# Patient Record
Sex: FEMALE | Race: WHITE | NOT HISPANIC OR LATINO | Employment: FULL TIME | URBAN - METROPOLITAN AREA
[De-identification: names, ages, dates, MRNs, and addresses within clinical notes are randomized per-mention and may not be internally consistent; named-entity substitution may affect disease eponyms.]

---

## 2024-04-08 ENCOUNTER — APPOINTMENT (OUTPATIENT)
Dept: RADIOLOGY | Facility: HOSPITAL | Age: 44
End: 2024-04-08
Payer: COMMERCIAL

## 2024-04-08 ENCOUNTER — HOSPITAL ENCOUNTER (EMERGENCY)
Facility: HOSPITAL | Age: 44
Discharge: HOME | End: 2024-04-08
Attending: EMERGENCY MEDICINE
Payer: COMMERCIAL

## 2024-04-08 ENCOUNTER — APPOINTMENT (OUTPATIENT)
Dept: CARDIOLOGY | Facility: HOSPITAL | Age: 44
End: 2024-04-08
Payer: COMMERCIAL

## 2024-04-08 VITALS
OXYGEN SATURATION: 100 % | HEART RATE: 69 BPM | SYSTOLIC BLOOD PRESSURE: 164 MMHG | RESPIRATION RATE: 18 BRPM | TEMPERATURE: 98.6 F | DIASTOLIC BLOOD PRESSURE: 84 MMHG

## 2024-04-08 DIAGNOSIS — R68.84 JAW PAIN: Primary | ICD-10-CM

## 2024-04-08 LAB
ALBUMIN SERPL-MCNC: 4.3 G/DL (ref 3.5–5)
ALP BLD-CCNC: 94 U/L (ref 35–125)
ALT SERPL-CCNC: 16 U/L (ref 5–40)
ANION GAP SERPL CALC-SCNC: 12 MMOL/L
APPEARANCE UR: CLEAR
AST SERPL-CCNC: 13 U/L (ref 5–40)
BASOPHILS # BLD AUTO: 0.02 X10*3/UL (ref 0–0.1)
BASOPHILS NFR BLD AUTO: 0.2 %
BILIRUB SERPL-MCNC: <0.2 MG/DL (ref 0.1–1.2)
BILIRUB UR STRIP.AUTO-MCNC: NEGATIVE MG/DL
BUN SERPL-MCNC: 15 MG/DL (ref 8–25)
CALCIUM SERPL-MCNC: 9.1 MG/DL (ref 8.5–10.4)
CHLORIDE SERPL-SCNC: 99 MMOL/L (ref 97–107)
CO2 SERPL-SCNC: 24 MMOL/L (ref 24–31)
COLOR UR: COLORLESS
CREAT SERPL-MCNC: 0.8 MG/DL (ref 0.4–1.6)
EGFRCR SERPLBLD CKD-EPI 2021: >90 ML/MIN/1.73M*2
EOSINOPHIL # BLD AUTO: 0.06 X10*3/UL (ref 0–0.7)
EOSINOPHIL NFR BLD AUTO: 0.6 %
ERYTHROCYTE [DISTWIDTH] IN BLOOD BY AUTOMATED COUNT: 16.7 % (ref 11.5–14.5)
GLUCOSE SERPL-MCNC: 106 MG/DL (ref 65–99)
GLUCOSE UR STRIP.AUTO-MCNC: NORMAL MG/DL
HCG UR QL IA.RAPID: NEGATIVE
HCT VFR BLD AUTO: 35.4 % (ref 36–46)
HGB BLD-MCNC: 10.9 G/DL (ref 12–16)
IMM GRANULOCYTES # BLD AUTO: 0.02 X10*3/UL (ref 0–0.7)
IMM GRANULOCYTES NFR BLD AUTO: 0.2 % (ref 0–0.9)
KETONES UR STRIP.AUTO-MCNC: NEGATIVE MG/DL
LEUKOCYTE ESTERASE UR QL STRIP.AUTO: NEGATIVE
LIPASE SERPL-CCNC: 45 U/L (ref 16–63)
LYMPHOCYTES # BLD AUTO: 2.08 X10*3/UL (ref 1.2–4.8)
LYMPHOCYTES NFR BLD AUTO: 19.9 %
MCH RBC QN AUTO: 22.9 PG (ref 26–34)
MCHC RBC AUTO-ENTMCNC: 30.8 G/DL (ref 32–36)
MCV RBC AUTO: 75 FL (ref 80–100)
MONOCYTES # BLD AUTO: 0.59 X10*3/UL (ref 0.1–1)
MONOCYTES NFR BLD AUTO: 5.6 %
NEUTROPHILS # BLD AUTO: 7.7 X10*3/UL (ref 1.2–7.7)
NEUTROPHILS NFR BLD AUTO: 73.5 %
NITRITE UR QL STRIP.AUTO: NEGATIVE
NRBC BLD-RTO: 0 /100 WBCS (ref 0–0)
PH UR STRIP.AUTO: 5 [PH]
PLATELET # BLD AUTO: 302 X10*3/UL (ref 150–450)
POTASSIUM SERPL-SCNC: 3.6 MMOL/L (ref 3.4–5.1)
PROT SERPL-MCNC: 7.8 G/DL (ref 5.9–7.9)
PROT UR STRIP.AUTO-MCNC: NEGATIVE MG/DL
RBC # BLD AUTO: 4.75 X10*6/UL (ref 4–5.2)
RBC # UR STRIP.AUTO: NEGATIVE /UL
SODIUM SERPL-SCNC: 135 MMOL/L (ref 133–145)
SP GR UR STRIP.AUTO: 1.01
TROPONIN T SERPL-MCNC: <6 NG/L
TROPONIN T SERPL-MCNC: <6 NG/L
UROBILINOGEN UR STRIP.AUTO-MCNC: NORMAL MG/DL
WBC # BLD AUTO: 10.5 X10*3/UL (ref 4.4–11.3)

## 2024-04-08 PROCEDURE — 81025 URINE PREGNANCY TEST: CPT | Performed by: PHYSICIAN ASSISTANT

## 2024-04-08 PROCEDURE — 80053 COMPREHEN METABOLIC PANEL: CPT | Performed by: PHYSICIAN ASSISTANT

## 2024-04-08 PROCEDURE — 36415 COLL VENOUS BLD VENIPUNCTURE: CPT | Performed by: PHYSICIAN ASSISTANT

## 2024-04-08 PROCEDURE — 85025 COMPLETE CBC W/AUTO DIFF WBC: CPT | Performed by: PHYSICIAN ASSISTANT

## 2024-04-08 PROCEDURE — 71046 X-RAY EXAM CHEST 2 VIEWS: CPT | Performed by: STUDENT IN AN ORGANIZED HEALTH CARE EDUCATION/TRAINING PROGRAM

## 2024-04-08 PROCEDURE — 83690 ASSAY OF LIPASE: CPT | Performed by: PHYSICIAN ASSISTANT

## 2024-04-08 PROCEDURE — 81003 URINALYSIS AUTO W/O SCOPE: CPT | Performed by: PHYSICIAN ASSISTANT

## 2024-04-08 PROCEDURE — 93005 ELECTROCARDIOGRAM TRACING: CPT

## 2024-04-08 PROCEDURE — 84484 ASSAY OF TROPONIN QUANT: CPT | Performed by: PHYSICIAN ASSISTANT

## 2024-04-08 PROCEDURE — 71046 X-RAY EXAM CHEST 2 VIEWS: CPT

## 2024-04-08 PROCEDURE — 99283 EMERGENCY DEPT VISIT LOW MDM: CPT | Mod: 25

## 2024-04-08 RX ORDER — ONDANSETRON HYDROCHLORIDE 2 MG/ML
4 INJECTION, SOLUTION INTRAVENOUS ONCE
Status: DISCONTINUED | OUTPATIENT
Start: 2024-04-08 | End: 2024-04-09 | Stop reason: HOSPADM

## 2024-04-08 RX ORDER — BUSPIRONE HYDROCHLORIDE 10 MG/1
10 TABLET ORAL 3 TIMES DAILY
COMMUNITY

## 2024-04-08 RX ORDER — PANTOPRAZOLE SODIUM 40 MG/10ML
40 INJECTION, POWDER, LYOPHILIZED, FOR SOLUTION INTRAVENOUS ONCE
Status: DISCONTINUED | OUTPATIENT
Start: 2024-04-08 | End: 2024-04-09 | Stop reason: HOSPADM

## 2024-04-08 RX ORDER — PANTOPRAZOLE SODIUM 40 MG/1
40 TABLET, DELAYED RELEASE ORAL
COMMUNITY

## 2024-04-08 ASSESSMENT — COLUMBIA-SUICIDE SEVERITY RATING SCALE - C-SSRS
2. HAVE YOU ACTUALLY HAD ANY THOUGHTS OF KILLING YOURSELF?: NO
1. IN THE PAST MONTH, HAVE YOU WISHED YOU WERE DEAD OR WISHED YOU COULD GO TO SLEEP AND NOT WAKE UP?: NO
6. HAVE YOU EVER DONE ANYTHING, STARTED TO DO ANYTHING, OR PREPARED TO DO ANYTHING TO END YOUR LIFE?: NO

## 2024-04-08 ASSESSMENT — LIFESTYLE VARIABLES
EVER FELT BAD OR GUILTY ABOUT YOUR DRINKING: NO
EVER HAD A DRINK FIRST THING IN THE MORNING TO STEADY YOUR NERVES TO GET RID OF A HANGOVER: NO
HAVE YOU EVER FELT YOU SHOULD CUT DOWN ON YOUR DRINKING: NO
TOTAL SCORE: 0
HAVE PEOPLE ANNOYED YOU BY CRITICIZING YOUR DRINKING: NO

## 2024-04-08 ASSESSMENT — PAIN SCALES - GENERAL: PAINLEVEL_OUTOF10: 5 - MODERATE PAIN

## 2024-04-08 ASSESSMENT — PAIN DESCRIPTION - PAIN TYPE: TYPE: ACUTE PAIN

## 2024-04-08 ASSESSMENT — PAIN DESCRIPTION - PROGRESSION: CLINICAL_PROGRESSION: NOT CHANGED

## 2024-04-08 ASSESSMENT — PAIN DESCRIPTION - DESCRIPTORS: DESCRIPTORS: ACHING

## 2024-04-08 ASSESSMENT — PAIN DESCRIPTION - LOCATION: LOCATION: JAW

## 2024-04-08 ASSESSMENT — PAIN - FUNCTIONAL ASSESSMENT: PAIN_FUNCTIONAL_ASSESSMENT: 0-10

## 2024-04-08 NOTE — ED TRIAGE NOTES
Jaw pain Onset 430pm, pt took tylenol with some relief. C/o constipation and nausea since starting ozempic Saturday.

## 2024-04-08 NOTE — LETTER
April 8, 2024    Patient: Lindsey Sage   YOB: 1980   Date of Visit: 4/8/2024       To Whom It May Concern:    Lindsey Sage was seen and treated in our emergency department on 4/8/2024. She can return to work on 4/10/2024 with no restrictions    If you have any questions or concerns, please don't hesitate to call.              CC: No Recipients

## 2024-04-09 LAB — HOLD SPECIMEN: NORMAL

## 2024-04-09 NOTE — ED PROVIDER NOTES
"HPI   Chief Complaint   Patient presents with    Jaw Pain       Patient is a 43-year-old female with past medical history of anxiety presenting with jaw pain from the urgent care.  Patient's history is a [employer] when she began to have bilateral jaw pain.  States that she went to urgent care for \"just a checkup to ensure she was okay \".  Urgent care referred her to the emergency department.  Patient denies any history of cardiac conditions and denies family history of cardiac conditions.  Patient denies fevers, chills, cough, nose, sore throat, chest pain, shortness of breath, abdominal pain, nausea, vomiting, diarrhea.  Patient states that several months ago, she had a cardiac workup done and everything was found to be normal.  States the main reason she wanted to be evaluated because she recently started a new medication, Ozempic several days ago and wanted to ensure that this was not a severe side effect.                          No data recorded                   Patient History   History reviewed. No pertinent past medical history.  Past Surgical History:   Procedure Laterality Date     SECTION, CLASSIC       No family history on file.  Social History     Tobacco Use    Smoking status: Never    Smokeless tobacco: Never   Substance Use Topics    Alcohol use: Not on file    Drug use: Never       Physical Exam   ED Triage Vitals   Temperature Heart Rate Respirations BP   24   37 °C (98.6 °F) 99 16 (!) 150/100      Pulse Ox Temp Source Heart Rate Source Patient Position   24   95 % Oral Monitor Sitting      BP Location FiO2 (%)     24 --     Left arm        Physical Exam  Constitutional:       Appearance: Normal appearance.      Comments: Awake, laying in examination bed, in no acute distress   HENT:      Head: Normocephalic and atraumatic.      Nose: Nose normal.      " Mouth/Throat:      Mouth: Mucous membranes are moist.      Pharynx: Oropharynx is clear.   Eyes:      Extraocular Movements: Extraocular movements intact.      Pupils: Pupils are equal, round, and reactive to light.   Cardiovascular:      Rate and Rhythm: Normal rate and regular rhythm.      Pulses: Normal pulses.      Heart sounds: Normal heart sounds.   Pulmonary:      Effort: Pulmonary effort is normal.      Breath sounds: Normal breath sounds.   Abdominal:      General: Abdomen is flat.      Palpations: Abdomen is soft.   Musculoskeletal:         General: Normal range of motion.      Cervical back: Normal range of motion and neck supple.   Skin:     General: Skin is warm and dry.      Capillary Refill: Capillary refill takes less than 2 seconds.   Neurological:      General: No focal deficit present.      Mental Status: She is alert and oriented to person, place, and time.   Psychiatric:         Mood and Affect: Mood normal.         Behavior: Behavior normal.         ED Course & [facility]   ED Course as of 04/08/24 2334   Mon Apr 08, 2024 1938 EKG interpretation: Sinus tachycardia, rate 104.  Normal axis.  Less than 1 mm of ST depression seen diffusely. [ML]      ED Course User Index  [ML] Thomas Orr MD         Diagnoses as of 04/08/24 2334   Jaw pain       Medical Decision Making  Patient is a 43-year-old male with past medical history of anxiety presenting with jaw pain.  CBC, CMP, magnesium, troponin, chest x-ray ordered.  Patient had initially endorsed some nausea, Zofran ordered.  IV fluids ordered.  Protonix ordered due to patient endorsing GERD like symptoms.  Conditions considered include but not limited to: Dental complication, ACS, anxiety related, musculoskeletal issue.      CBC is without leukocytosis but does show evidence of anemia with hemoglobin at 10.9.  Patient states that she does chronically suffer from anemia so this does appear stable.  CMP is without significant electrolyte abnormality.   Magnesium within normal limits.  Initial and repeat troponin within normal notes.  Chest x-ray is without acute cardiopulmonary process.  I believe this patient is at low risk for complication, and a disoposition of discharge is acceptable.  Return to the Emergency Department if new or worsening symptoms including headache, fever, chills, chest pain, shortness of breath, syncope, near syncope, abdominal pain, nausea, vomiting,  diarrhea, or worsening pain.  Discussed with the patient to follow-up with primary care to further discuss her acute onset of jaw pain symptoms.  She states that she is not currently suffering from jaw pain and that resolved prior to arrival to the emergency department.  Nursing staff tells me that the patient did refuse IV medications ordered.  Patient is agreeable to disposition of discharge with follow-up with primary care.      Portions of this note made with Dragon software, please be mindful of potential grammatical errors.        Medications   ondansetron (Zofran) injection 4 mg (4 mg intravenous Not Given 4/8/24 1920)   sodium chloride 0.9 % bolus 1,000 mL (1,000 mL intravenous Not Given 4/8/24 1920)   pantoprazole (ProtoNix) injection 40 mg (40 mg intravenous Not Given 4/8/24 2050)         Labs Reviewed   CBC WITH AUTO DIFFERENTIAL - Abnormal       Result Value    WBC 10.5      nRBC 0.0      RBC 4.75      Hemoglobin 10.9 (*)     Hematocrit 35.4 (*)     MCV 75 (*)     MCH 22.9 (*)     MCHC 30.8 (*)     RDW 16.7 (*)     Platelets 302      Neutrophils % 73.5      Immature Granulocytes %, Automated 0.2      Lymphocytes % 19.9      Monocytes % 5.6      Eosinophils % 0.6      Basophils % 0.2      Neutrophils Absolute 7.70      Immature Granulocytes Absolute, Automated 0.02      Lymphocytes Absolute 2.08      Monocytes Absolute 0.59      Eosinophils Absolute 0.06      Basophils Absolute 0.02     COMPREHENSIVE METABOLIC PANEL - Abnormal    Glucose 106 (*)     Sodium 135      Potassium 3.6       Chloride 99      Bicarbonate 24      Urea Nitrogen 15      Creatinine 0.80      eGFR >90      Calcium 9.1      Albumin 4.3      Alkaline Phosphatase 94      Total Protein 7.8      AST 13      Bilirubin, Total <0.2      ALT 16      Anion Gap 12     URINALYSIS WITH REFLEX CULTURE AND MICROSCOPIC - Abnormal    Color, Urine Colorless (*)     Appearance, Urine Clear      Specific Gravity, Urine 1.014      pH, Urine 5.0      Protein, Urine NEGATIVE      Glucose, Urine Normal      Blood, Urine NEGATIVE      Ketones, Urine NEGATIVE      Bilirubin, Urine NEGATIVE      Urobilinogen, Urine Normal      Nitrite, Urine NEGATIVE      Leukocyte Esterase, Urine NEGATIVE     HCG, URINE, QUALITATIVE - Normal    HCG, Urine NEGATIVE     SERIAL TROPONIN, INITIAL ([facility]) - Normal    Troponin T, High Sensitivity <6     LIPASE - Normal    Lipase 45     SERIAL TROPONIN,  2 HOUR ([facility]) - Normal    Troponin T, High Sensitivity <6     URINALYSIS WITH REFLEX CULTURE AND MICROSCOPIC    Narrative:     The following orders were created for panel order Urinalysis with Reflex Culture and Microscopic.  Procedure                               Abnormality         Status                     ---------                               -----------         ------                     Urinalysis with Reflex C...[[id number]]  Abnormal            Final result               Extra Urine Gray Tube[[id number]]                            In process                   Please view results for these tests on the individual orders.   TROPONIN T SERIES, HIGH SENSITIVITY (0, 2 HR, 6 HR)    Narrative:     The following orders were created for panel order Troponin T Series, High Sensitivity (0, 2HR, 6HR).  Procedure                               Abnormality         Status                     ---------                               -----------         ------                     Serial Troponin, Initial...[[id number]]  Normal              Final result               Serial Troponin,  2 Hour ...[[id number]]  Normal              Final result               Serial Troponin, 6 Hour ...[[id number]]                                                   Please view results for these tests on the individual orders.   EXTRA URINE GRAY TUBE   SERIAL TROPONIN, 6 HOUR ([facility])         XR chest 2 views   Final Result   1.  No evidence of acute cardiopulmonary process.                  MACRO:   None        Signed by: Tammi Mayer 4/8/2024 8:15 PM   Dictation workstation:   [id number]            Procedure  Procedures     Renzo King PA-C  04/08/24 2334

## 2024-04-13 LAB
ATRIAL RATE: 104 BPM
P AXIS: 43 DEGREES
P OFFSET: 200 MS
P ONSET: 144 MS
PR INTERVAL: 146 MS
Q ONSET: 217 MS
QRS COUNT: 17 BEATS
QRS DURATION: 78 MS
QT INTERVAL: 326 MS
QTC CALCULATION(BAZETT): 428 MS
QTC FREDERICIA: 391 MS
R AXIS: 42 DEGREES
T AXIS: -21 DEGREES
T OFFSET: 380 MS
VENTRICULAR RATE: 104 BPM

## 2024-04-15 ENCOUNTER — HOSPITAL ENCOUNTER (OUTPATIENT)
Dept: RADIOLOGY | Facility: HOSPITAL | Age: 44
Discharge: HOME | End: 2024-04-15
Payer: COMMERCIAL

## 2024-04-15 DIAGNOSIS — M79.89 LEFT LEG SWELLING: ICD-10-CM

## 2024-04-15 PROCEDURE — 93971 EXTREMITY STUDY: CPT

## 2024-04-15 PROCEDURE — 93971 EXTREMITY STUDY: CPT | Performed by: RADIOLOGY

## 2024-06-26 ENCOUNTER — HOSPITAL ENCOUNTER (EMERGENCY)
Facility: HOSPITAL | Age: 44
Discharge: HOME | End: 2024-06-26
Attending: STUDENT IN AN ORGANIZED HEALTH CARE EDUCATION/TRAINING PROGRAM
Payer: COMMERCIAL

## 2024-06-26 ENCOUNTER — APPOINTMENT (OUTPATIENT)
Dept: CARDIOLOGY | Facility: HOSPITAL | Age: 44
End: 2024-06-26
Payer: COMMERCIAL

## 2024-06-26 ENCOUNTER — APPOINTMENT (OUTPATIENT)
Dept: RADIOLOGY | Facility: HOSPITAL | Age: 44
End: 2024-06-26
Payer: COMMERCIAL

## 2024-06-26 VITALS
TEMPERATURE: 98.4 F | DIASTOLIC BLOOD PRESSURE: 99 MMHG | OXYGEN SATURATION: 100 % | RESPIRATION RATE: 16 BRPM | WEIGHT: 293 LBS | SYSTOLIC BLOOD PRESSURE: 167 MMHG | HEART RATE: 87 BPM | BODY MASS INDEX: 50.02 KG/M2 | HEIGHT: 64 IN

## 2024-06-26 DIAGNOSIS — R07.9 CHEST PAIN, UNSPECIFIED TYPE: Primary | ICD-10-CM

## 2024-06-26 DIAGNOSIS — R10.84 GENERALIZED ABDOMINAL PAIN: ICD-10-CM

## 2024-06-26 DIAGNOSIS — K21.9 GASTROESOPHAGEAL REFLUX DISEASE, UNSPECIFIED WHETHER ESOPHAGITIS PRESENT: ICD-10-CM

## 2024-06-26 LAB
ALBUMIN SERPL-MCNC: 4.2 G/DL (ref 3.5–5)
ALP BLD-CCNC: 81 U/L (ref 35–125)
ALT SERPL-CCNC: 12 U/L (ref 5–40)
ANION GAP SERPL CALC-SCNC: 10 MMOL/L
APPEARANCE UR: CLEAR
AST SERPL-CCNC: 15 U/L (ref 5–40)
ATRIAL RATE: 95 BPM
BASOPHILS # BLD AUTO: 0.02 X10*3/UL (ref 0–0.1)
BASOPHILS NFR BLD AUTO: 0.3 %
BILIRUB SERPL-MCNC: 0.2 MG/DL (ref 0.1–1.2)
BILIRUB UR STRIP.AUTO-MCNC: NEGATIVE MG/DL
BUN SERPL-MCNC: 12 MG/DL (ref 8–25)
CALCIUM SERPL-MCNC: 9.6 MG/DL (ref 8.5–10.4)
CHLORIDE SERPL-SCNC: 101 MMOL/L (ref 97–107)
CO2 SERPL-SCNC: 25 MMOL/L (ref 24–31)
COLOR UR: COLORLESS
CREAT SERPL-MCNC: 0.9 MG/DL (ref 0.4–1.6)
EGFRCR SERPLBLD CKD-EPI 2021: 82 ML/MIN/1.73M*2
EOSINOPHIL # BLD AUTO: 0.09 X10*3/UL (ref 0–0.7)
EOSINOPHIL NFR BLD AUTO: 1.2 %
ERYTHROCYTE [DISTWIDTH] IN BLOOD BY AUTOMATED COUNT: 17.8 % (ref 11.5–14.5)
GLUCOSE SERPL-MCNC: 99 MG/DL (ref 65–99)
GLUCOSE UR STRIP.AUTO-MCNC: NORMAL MG/DL
HCG UR QL IA.RAPID: NEGATIVE
HCT VFR BLD AUTO: 35.5 % (ref 36–46)
HGB BLD-MCNC: 10.9 G/DL (ref 12–16)
IMM GRANULOCYTES # BLD AUTO: 0.03 X10*3/UL (ref 0–0.7)
IMM GRANULOCYTES NFR BLD AUTO: 0.4 % (ref 0–0.9)
KETONES UR STRIP.AUTO-MCNC: NEGATIVE MG/DL
LEUKOCYTE ESTERASE UR QL STRIP.AUTO: NEGATIVE
LIPASE SERPL-CCNC: 57 U/L (ref 16–63)
LYMPHOCYTES # BLD AUTO: 2.17 X10*3/UL (ref 1.2–4.8)
LYMPHOCYTES NFR BLD AUTO: 28.3 %
MCH RBC QN AUTO: 22.9 PG (ref 26–34)
MCHC RBC AUTO-ENTMCNC: 30.7 G/DL (ref 32–36)
MCV RBC AUTO: 75 FL (ref 80–100)
MONOCYTES # BLD AUTO: 0.54 X10*3/UL (ref 0.1–1)
MONOCYTES NFR BLD AUTO: 7 %
NEUTROPHILS # BLD AUTO: 4.82 X10*3/UL (ref 1.2–7.7)
NEUTROPHILS NFR BLD AUTO: 62.8 %
NITRITE UR QL STRIP.AUTO: NEGATIVE
NRBC BLD-RTO: 0 /100 WBCS (ref 0–0)
P AXIS: 37 DEGREES
P OFFSET: 204 MS
P ONSET: 154 MS
PH UR STRIP.AUTO: 5.5 [PH]
PLATELET # BLD AUTO: 290 X10*3/UL (ref 150–450)
POTASSIUM SERPL-SCNC: 3.8 MMOL/L (ref 3.4–5.1)
PR INTERVAL: 136 MS
PROT SERPL-MCNC: 7.4 G/DL (ref 5.9–7.9)
PROT UR STRIP.AUTO-MCNC: NEGATIVE MG/DL
Q ONSET: 222 MS
QRS COUNT: 16 BEATS
QRS DURATION: 76 MS
QT INTERVAL: 324 MS
QTC CALCULATION(BAZETT): 407 MS
QTC FREDERICIA: 377 MS
R AXIS: 22 DEGREES
RBC # BLD AUTO: 4.75 X10*6/UL (ref 4–5.2)
RBC # UR STRIP.AUTO: NEGATIVE /UL
SODIUM SERPL-SCNC: 136 MMOL/L (ref 133–145)
SP GR UR STRIP.AUTO: 1.01
T AXIS: 2 DEGREES
T OFFSET: 384 MS
TROPONIN T SERPL-MCNC: 6 NG/L
TROPONIN T SERPL-MCNC: <6 NG/L
UROBILINOGEN UR STRIP.AUTO-MCNC: NORMAL MG/DL
VENTRICULAR RATE: 95 BPM
WBC # BLD AUTO: 7.7 X10*3/UL (ref 4.4–11.3)

## 2024-06-26 PROCEDURE — 80053 COMPREHEN METABOLIC PANEL: CPT

## 2024-06-26 PROCEDURE — 2500000004 HC RX 250 GENERAL PHARMACY W/ HCPCS (ALT 636 FOR OP/ED)

## 2024-06-26 PROCEDURE — 2550000001 HC RX 255 CONTRASTS

## 2024-06-26 PROCEDURE — 81003 URINALYSIS AUTO W/O SCOPE: CPT

## 2024-06-26 PROCEDURE — 83690 ASSAY OF LIPASE: CPT

## 2024-06-26 PROCEDURE — 99285 EMERGENCY DEPT VISIT HI MDM: CPT | Mod: 25

## 2024-06-26 PROCEDURE — 71045 X-RAY EXAM CHEST 1 VIEW: CPT

## 2024-06-26 PROCEDURE — 96361 HYDRATE IV INFUSION ADD-ON: CPT

## 2024-06-26 PROCEDURE — 74177 CT ABD & PELVIS W/CONTRAST: CPT

## 2024-06-26 PROCEDURE — 93005 ELECTROCARDIOGRAM TRACING: CPT

## 2024-06-26 PROCEDURE — 71045 X-RAY EXAM CHEST 1 VIEW: CPT | Performed by: RADIOLOGY

## 2024-06-26 PROCEDURE — 74177 CT ABD & PELVIS W/CONTRAST: CPT | Performed by: RADIOLOGY

## 2024-06-26 PROCEDURE — 36415 COLL VENOUS BLD VENIPUNCTURE: CPT

## 2024-06-26 PROCEDURE — 2500000001 HC RX 250 WO HCPCS SELF ADMINISTERED DRUGS (ALT 637 FOR MEDICARE OP)

## 2024-06-26 PROCEDURE — 85025 COMPLETE CBC W/AUTO DIFF WBC: CPT

## 2024-06-26 PROCEDURE — 96374 THER/PROPH/DIAG INJ IV PUSH: CPT

## 2024-06-26 PROCEDURE — 84484 ASSAY OF TROPONIN QUANT: CPT

## 2024-06-26 PROCEDURE — 81025 URINE PREGNANCY TEST: CPT

## 2024-06-26 PROCEDURE — 96375 TX/PRO/DX INJ NEW DRUG ADDON: CPT

## 2024-06-26 RX ORDER — FAMOTIDINE 10 MG/ML
20 INJECTION INTRAVENOUS ONCE
Status: COMPLETED | OUTPATIENT
Start: 2024-06-26 | End: 2024-06-26

## 2024-06-26 RX ORDER — LIDOCAINE HYDROCHLORIDE 20 MG/ML
15 SOLUTION OROPHARYNGEAL ONCE
Status: COMPLETED | OUTPATIENT
Start: 2024-06-26 | End: 2024-06-26

## 2024-06-26 RX ORDER — ALUMINUM HYDROXIDE, MAGNESIUM HYDROXIDE, AND SIMETHICONE 1200; 120; 1200 MG/30ML; MG/30ML; MG/30ML
30 SUSPENSION ORAL ONCE
Status: COMPLETED | OUTPATIENT
Start: 2024-06-26 | End: 2024-06-26

## 2024-06-26 RX ORDER — ONDANSETRON HYDROCHLORIDE 2 MG/ML
4 INJECTION, SOLUTION INTRAVENOUS ONCE
Status: COMPLETED | OUTPATIENT
Start: 2024-06-26 | End: 2024-06-26

## 2024-06-26 ASSESSMENT — PAIN SCALES - GENERAL
PAINLEVEL_OUTOF10: 3
PAINLEVEL_OUTOF10: 3

## 2024-06-26 ASSESSMENT — PAIN DESCRIPTION - LOCATION: LOCATION: CHEST

## 2024-06-26 ASSESSMENT — LIFESTYLE VARIABLES
HAVE YOU EVER FELT YOU SHOULD CUT DOWN ON YOUR DRINKING: NO
EVER FELT BAD OR GUILTY ABOUT YOUR DRINKING: NO
EVER HAD A DRINK FIRST THING IN THE MORNING TO STEADY YOUR NERVES TO GET RID OF A HANGOVER: NO
TOTAL SCORE: 0
HAVE PEOPLE ANNOYED YOU BY CRITICIZING YOUR DRINKING: NO

## 2024-06-26 ASSESSMENT — HEART SCORE
TROPONIN: LESS THAN OR EQUAL TO NORMAL LIMIT
RISK FACTORS: 1-2 RISK FACTORS
AGE: <45
HEART SCORE: 2
HISTORY: SLIGHTLY SUSPICIOUS
ECG: NON-SPECIFIC REPOLARIZATION DISTURBANCE

## 2024-06-26 ASSESSMENT — COLUMBIA-SUICIDE SEVERITY RATING SCALE - C-SSRS
1. IN THE PAST MONTH, HAVE YOU WISHED YOU WERE DEAD OR WISHED YOU COULD GO TO SLEEP AND NOT WAKE UP?: NO
6. HAVE YOU EVER DONE ANYTHING, STARTED TO DO ANYTHING, OR PREPARED TO DO ANYTHING TO END YOUR LIFE?: NO
2. HAVE YOU ACTUALLY HAD ANY THOUGHTS OF KILLING YOURSELF?: NO

## 2024-06-26 ASSESSMENT — PAIN DESCRIPTION - DESCRIPTORS: DESCRIPTORS: BURNING;DISCOMFORT

## 2024-06-26 ASSESSMENT — PAIN - FUNCTIONAL ASSESSMENT: PAIN_FUNCTIONAL_ASSESSMENT: 0-10

## 2024-06-26 ASSESSMENT — PAIN DESCRIPTION - PAIN TYPE: TYPE: ACUTE PAIN

## 2024-06-26 NOTE — DISCHARGE INSTRUCTIONS
Follow closely with your primary care provider in the following 1 to 2 days.    Follow-up closely with cardiology listed.

## 2024-06-26 NOTE — ED PROVIDER NOTES
HPI   Chief Complaint   Patient presents with    Chest Pain       Patient is a 43-year-old female presents emergency department for evaluation of abdominal pain, bloating, and chest pain.  She states that she was sent over by her primary care provider for further evaluation given her chest pain and bloating.  She states that she has been on Ozempic for 10 weeks for weight loss.  She states that at the beginning she had similar symptoms, but they went away.  She states over the last few days she has had increased belching, gas, bloating, and abdominal pain.  She describes it as a cramping dull aching pain.  She states that she has had increased burning in her chest as well.  She states the pain has been more persistent in the chest and comes and goes, but causes her to have some anxiety.  She does not necessarily feel short of breath with it.  She states that she has a familial history of early cardiac death, but no personal history of any cardiac disease.  She denies any history of high blood pressure, high cholesterol, or diabetes.  She states that she has been dealing with some constipation as well and wonders if this may be contributing.  She states because of the pain in her abdomen and chest, her primary care provider was concerned for possible obstruction and sent her in for further evaluation.  She states that she has been having bowel movements that are harder and more firm.  She is been urinating without any difficulty.  She denies any lightheadedness, dizziness, vomiting, fevers, chills, cough, congestion.      History provided by:  Patient   used: No                        Rowan Coma Scale Score: 15                     Patient History   No past medical history on file.  Past Surgical History:   Procedure Laterality Date     SECTION, CLASSIC       No family history on file.  Social History     Tobacco Use    Smoking status: Never    Smokeless tobacco: Never   Substance Use  Topics    Alcohol use: Not on file    Drug use: Never       Physical Exam   ED Triage Vitals [06/26/24 1449]   Temperature Heart Rate Respirations BP   36.9 °C (98.4 °F) 95 16 (!) 152/102      Pulse Ox Temp Source Heart Rate Source Patient Position   100 % Oral -- Sitting      BP Location FiO2 (%)     Right arm --       Physical Exam  Constitutional:       Appearance: She is well-developed.   Cardiovascular:      Rate and Rhythm: Normal rate and regular rhythm.   Pulmonary:      Effort: Pulmonary effort is normal.      Breath sounds: Normal breath sounds.   Abdominal:      Palpations: Abdomen is soft.      Tenderness: There is abdominal tenderness.   Musculoskeletal:         General: Normal range of motion.   Skin:     General: Skin is warm and dry.   Neurological:      General: No focal deficit present.      Mental Status: She is alert and oriented to person, place, and time.         ED Course & [facility]   ED Course as of 06/26/24 2121 Wed Jun 26, 2024   1449 EKG Time:1446  EKG Interpretation time:1449  EKG Interpretation: EKG shows normal sinus rhythm with a rate of 95 bpm, normal axis, QTc 407, nonspecific T wave flattening but no evidence of STEMI.    EKG was interpreted by myself independently [JL]      ED Course User Index  [JL] Alexis Warren DO         Diagnoses as of 06/26/24 2121   Chest pain, unspecified type   Gastroesophageal reflux disease, unspecified whether esophagitis present   Generalized abdominal pain       Medical Decision Making  Patient is a 43-year-old female presents emergency department for evaluation of chest pain and abdominal pain over the last several days.    EKG was interpreted by attending physician and reviewed by me.    Lab work done today included CMP, CBC, troponins, lipase, urinalysis, urine pregnancy.  Lab work with initial troponin less than 6 repeat troponin of 6 and mild anemia otherwise unremarkable.    Scans done today were interpreted/confirmed by radiologist and also  interpreted by me which included chest x-ray and CT abdomen/pelvis with contrast.  Chest x-ray shows no acute cardiopulmonary process.  CT abdomen pelvis shows unremarkable abdomen and pelvis with no acute findings.    Medications given at today's visit include IV fluids, IV Pepcid, IV Zofran, p.o. GI cocktail    I saw this patient in conjunction with Dr. Warren.  Feeling improved medication given emergency department.  Suspect her chest pain is likely related to her reflux given radiation of pain from the abdomen.  EKG shows no evidence ischemia and troponins within normal range.  Patient has heart score of 2 giving her low risk for major cardiac event.  Chest x-ray is clear.  CT scan shows no acute abnormality contributing to patient's pain at today's visit.  Given that she is on Ozempic likely this medication contributing to some constipation as well as some dysmotility causing patient's bloating and increased gas.  She will follow-up closely outpatient with primary care provider along with cardiology.  No indication for admission at this time.  Emergent pathologies were considered for this patient, although I have low suspicion for anything acutely emergent given patient's clinical presentation, history, physical exam, stable vital signs, and relatively unremarkable workup.  Discharging patient home is reasonable plan of care for outpatient management.    All labs, imaging, and diagnostic studies were reviewed by me and patient was counseled on clinical impression, expectations, and plan.  Patient was educated to follow-up with PCP in the following 1-2 days.  All questions from patient were answered. They elicited understanding and were agreeable to course of treatment.  Patient was discharged in stable condition and given strict return precautions.    ** Disclaimer:  Parts of this document were written utilizing a voice to text dictation software.  Note may contain minor transcription or typographical errors  that were inadvertently transcribed by the computer software.        Procedure  Procedures     Angela Perea PA-C  06/26/24 2127

## 2024-06-27 ENCOUNTER — LAB (OUTPATIENT)
Dept: LAB | Facility: LAB | Age: 44
End: 2024-06-27
Payer: COMMERCIAL

## 2024-06-27 DIAGNOSIS — D64.9 ANEMIA, UNSPECIFIED: Primary | ICD-10-CM

## 2024-06-27 LAB
FERRITIN SERPL-MCNC: 7 NG/ML (ref 13–150)
FOLATE SERPL-MCNC: 9.1 NG/ML (ref 4.2–19.9)
HOLD SPECIMEN: NORMAL
IRON SATN MFR SERPL: 7 % (ref 12–50)
IRON SERPL-MCNC: 30 UG/DL (ref 30–160)
TIBC SERPL-MCNC: 409 UG/DL (ref 228–428)
TSH SERPL DL<=0.05 MIU/L-ACNC: 3.95 MIU/L (ref 0.27–4.2)
UIBC SERPL-MCNC: 379 UG/DL (ref 110–370)
VIT B12 SERPL-MCNC: 352 PG/ML (ref 211–946)

## 2024-06-27 PROCEDURE — 82607 VITAMIN B-12: CPT

## 2024-06-27 PROCEDURE — 82746 ASSAY OF FOLIC ACID SERUM: CPT

## 2024-06-27 PROCEDURE — 36415 COLL VENOUS BLD VENIPUNCTURE: CPT

## 2024-06-27 PROCEDURE — 83550 IRON BINDING TEST: CPT

## 2024-06-27 PROCEDURE — 84443 ASSAY THYROID STIM HORMONE: CPT

## 2024-06-27 PROCEDURE — 82728 ASSAY OF FERRITIN: CPT

## 2024-06-27 PROCEDURE — 83540 ASSAY OF IRON: CPT

## 2024-06-27 NOTE — ED PROVIDER NOTES
Patient was seen by both myself and advanced practitioner.  I personally saw the patient and made/approved the management plan and take responsibility for the patient management     Patient is a 43-year-old female that presents emergency room for evaluation of chest pain, epigastric pain.  Patient states that she has been having epigastric discomfort for the last several weeks.  It is described as an aching burning sensation that is now radiating up into the chest.  Nothing seems to make it better or worse.  She does note that she has been on Ozempic for the last 10 weeks.  She initially did go to see her primary care physician however given patient's significant family cardiac history patient was sent in for further cardiac evaluation.  No medications were taken prior to arrival.  She denies shortness of breath, fevers, chills, change in bowel habits, dysuria or hematuria.    On exam patient uncomfortable appearing but in no obvious distress.  She is awake, alert and oriented.  Respirations easy, nonlabored.  Vital signs are hemodynamically stable with normal heart rate, normal oxygen saturation of 100% on room air.  She is mildly hypertensive with a blood pressure of 152/102.  No peripheral pitting edema.  EKG performed showed normal sinus rhythm with a rate of 95 bpm with nonspecific T wave flattening but no evidence of STEMI.  Blood work ordered including CBC, CMP, troponin, lipase along with urinalysis.  Chest x-ray is clear showing no evidence pneumonia, pneumothorax, wide mediastinum.  CT scan of the on pelvis was also performed given patient epigastric pain however showed no evidence of bowel obstruction, perforation or abscess.  Blood work unremarkable including normal electrolytes, normal kidney function, normal white count with no left shift.  Urinalysis shows no evidence urinary tract infection.  Troponin was normal at 6 and remained flat on repeat testing at less than 6.  Patient is considered very low  risk and has a heart score of 2 at this time.  She is feeling better with symptomatic treatment with p.o. famotidine, GI cocktail, IV Zofran and IV fluids.  It is felt this pain is likely related to patient's epigastric discomfort and possible GI source of her symptoms.  Patient will be given both a cardiologist as well as follow-up with gastroenterology.  Return precautions were discussed with patient.    I independently interpreted the following study(s) EKG, chest x-ray, CT scan of the abdomen pelvis which show EKG showed normal sinus rhythm with no STEMI.  Chest x-ray is clear showing no evidence pneumonia, pneumothorax, wide mediastinum.  CT scan of the abdomen pelvis shows no evidence of bowel obstruction, perforation, abscess, cholecystitis or choledocholithiasis, appendicitis       Alexis Warren DO  06/27/24 0033

## 2024-07-25 ENCOUNTER — APPOINTMENT (OUTPATIENT)
Dept: ENDOCRINOLOGY | Facility: CLINIC | Age: 44
End: 2024-07-25
Payer: COMMERCIAL

## 2024-08-06 ENCOUNTER — APPOINTMENT (OUTPATIENT)
Dept: OBSTETRICS AND GYNECOLOGY | Facility: CLINIC | Age: 44
End: 2024-08-06
Payer: COMMERCIAL

## 2024-08-08 ENCOUNTER — OFFICE VISIT (OUTPATIENT)
Dept: PRIMARY CARE | Facility: CLINIC | Age: 44
End: 2024-08-08
Payer: COMMERCIAL

## 2024-08-08 VITALS
HEIGHT: 64 IN | WEIGHT: 293 LBS | SYSTOLIC BLOOD PRESSURE: 146 MMHG | DIASTOLIC BLOOD PRESSURE: 72 MMHG | BODY MASS INDEX: 50.02 KG/M2

## 2024-08-08 DIAGNOSIS — R05.9 COUGH, UNSPECIFIED TYPE: ICD-10-CM

## 2024-08-08 DIAGNOSIS — K21.9 GASTROESOPHAGEAL REFLUX DISEASE, UNSPECIFIED WHETHER ESOPHAGITIS PRESENT: ICD-10-CM

## 2024-08-08 DIAGNOSIS — J32.9 SINUSITIS, UNSPECIFIED CHRONICITY, UNSPECIFIED LOCATION: ICD-10-CM

## 2024-08-08 DIAGNOSIS — R40.0 DAYTIME SOMNOLENCE: ICD-10-CM

## 2024-08-08 DIAGNOSIS — R51.9 LT FACIAL PAIN: Primary | ICD-10-CM

## 2024-08-08 DIAGNOSIS — F41.9 ANXIETY: ICD-10-CM

## 2024-08-08 PROBLEM — F41.0 PANIC DISORDER: Status: ACTIVE | Noted: 2024-08-08

## 2024-08-08 PROBLEM — J30.2 SEASONAL ALLERGIC RHINITIS: Status: ACTIVE | Noted: 2017-03-01

## 2024-08-08 PROBLEM — E66.813 OBESITY, CLASS III, BMI 40-49.9 (MORBID OBESITY): Status: ACTIVE | Noted: 2024-01-22

## 2024-08-08 PROBLEM — E66.01 OBESITY, CLASS III, BMI 40-49.9 (MORBID OBESITY) (MULTI): Status: RESOLVED | Noted: 2024-01-22 | Resolved: 2024-08-08

## 2024-08-08 PROBLEM — E66.813 OBESITY, CLASS III, BMI 40-49.9 (MORBID OBESITY): Status: RESOLVED | Noted: 2024-01-22 | Resolved: 2024-08-08

## 2024-08-08 PROBLEM — E66.01 MORBID OBESITY DUE TO EXCESS CALORIES (MULTI): Status: RESOLVED | Noted: 2017-03-01 | Resolved: 2024-08-08

## 2024-08-08 PROBLEM — E66.01 OBESITY, CLASS III, BMI 40-49.9 (MORBID OBESITY) (MULTI): Status: ACTIVE | Noted: 2024-01-22

## 2024-08-08 PROBLEM — J30.2 SEASONAL ALLERGIES: Status: ACTIVE | Noted: 2024-08-08

## 2024-08-08 PROBLEM — E66.01 MORBID OBESITY DUE TO EXCESS CALORIES (MULTI): Status: ACTIVE | Noted: 2017-03-01

## 2024-08-08 PROCEDURE — 3008F BODY MASS INDEX DOCD: CPT | Performed by: INTERNAL MEDICINE

## 2024-08-08 PROCEDURE — 99214 OFFICE O/P EST MOD 30 MIN: CPT | Performed by: INTERNAL MEDICINE

## 2024-08-08 RX ORDER — TRIAMCINOLONE ACETONIDE 55 UG/1
2 SPRAY, METERED NASAL DAILY
Qty: 16.5 G | Refills: 0 | Status: SHIPPED | OUTPATIENT
Start: 2024-08-08 | End: 2025-08-08

## 2024-08-08 RX ORDER — FERROUS SULFATE 325(65) MG
65 TABLET, DELAYED RELEASE (ENTERIC COATED) ORAL
COMMUNITY

## 2024-08-08 RX ORDER — FAMOTIDINE 20 MG/1
20 TABLET, FILM COATED ORAL DAILY
COMMUNITY

## 2024-08-08 RX ORDER — LORATADINE 10 MG/1
10 TABLET ORAL DAILY
Qty: 30 TABLET | Refills: 0 | Status: SHIPPED | OUTPATIENT
Start: 2024-08-08 | End: 2024-09-07

## 2024-08-08 RX ORDER — AMOXICILLIN AND CLAVULANATE POTASSIUM 875; 125 MG/1; MG/1
875 TABLET, FILM COATED ORAL 2 TIMES DAILY
Qty: 20 TABLET | Refills: 0 | Status: SHIPPED | OUTPATIENT
Start: 2024-08-08 | End: 2024-08-18

## 2024-08-08 ASSESSMENT — ENCOUNTER SYMPTOMS: SINUS COMPLAINT: 1

## 2024-08-08 NOTE — PROGRESS NOTES
"Subjective   Patient ID: Lindsey Sage is a 43 y.o. female who presents for New Patient Visit and Sinus Problem.    This is a Dr. Bond patient.  I have an honor of being her doctor at this time.  She came here for follow-up with Dr. Bond when she comes back.  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  She has more pain on the left side.    IMMUNIZATION:  Tetanus within the last 10 years.    I have personally reviewed the patient's Past Medical History, Medications, Allergies, Social History, and Family History in the EMR.    Sinus Problem    Review of Systems   All other systems reviewed and are negative.   No heart attack, stroke, diabetes, or cancer.  There is a chance there could be any sleep apnea.    Objective   /72   Ht 1.626 m (5' 4\")   Wt 144 kg (318 lb)   BMI 54.58 kg/m²     Physical Exam  Vitals reviewed.   HENT:      Right Ear: Tympanic membrane, ear canal and external ear normal.      Left Ear: Tympanic membrane, ear canal and external ear normal.      Nose: Congestion present.      Comments: Postnasal drip.  Sinus tenderness.     Mouth/Throat:      Comments: Throat congested  Eyes:      General: No scleral icterus.     Pupils: Pupils are equal, round, and reactive to light.   Neck:      Vascular: No carotid bruit.   Cardiovascular:      Heart sounds: Normal heart sounds, S1 normal and S2 normal. No murmur heard.     No friction rub.   Pulmonary:      Effort: Pulmonary effort is normal.      Breath sounds: Normal breath sounds and air entry.   Chest:      Comments: BREAST:  Deferred by the patient.  Abdominal:      Palpations: There is no hepatomegaly, splenomegaly or mass.   Genitourinary:     Comments: VAGINAL:  Deferred by the patient.  RECTAL:  Deferred by the patient.  Musculoskeletal:         General: No swelling or deformity. Normal range of motion.      Cervical back: Neck " supple.      Right lower leg: No edema.      Left lower leg: No edema.   Lymphadenopathy:      Cervical: No cervical adenopathy.      Upper Body:      Right upper body: No axillary adenopathy.      Left upper body: No axillary adenopathy.      Lower Body: No right inguinal adenopathy. No left inguinal adenopathy.   Neurological:      Mental Status: She is oriented to person, place, and time.      Cranial Nerves: Cranial nerves 2-12 are intact. No cranial nerve deficit.      Sensory: No sensory deficit.      Motor: Motor function is intact. No weakness.      Gait: Gait is intact.      Deep Tendon Reflexes: Reflexes normal.   Psychiatric:         Mood and Affect: Mood normal. Mood is not anxious or depressed. Affect is not angry.         Behavior: Behavior is not agitated.         Thought Content: Thought content normal.         Judgment: Judgment normal.     LAB WORK:  Laboratory testing discussed.    Assessment/Plan   Problem List Items Addressed This Visit    None  Visit Diagnoses         Codes    Lt facial pain    -  Primary R51.9    Sinusitis, unspecified chronicity, unspecified location     J32.9    Cough, unspecified type     R05.9    Gastroesophageal reflux disease, unspecified whether esophagitis present     K21.9    Anxiety     F41.9    Daytime somnolence     R40.0        1. Acute sinusitis, postnasal congestion, left facial pain.  Advised saline gargles.  Augmentin, Claritin-D, Robitussin DM.  Monitor.  2. GERD, on PPI.  3. Anxiety.  Buspar.  She sees Dr. Bond.  4. Gynecological.  She is regular with Pap test and mammogram.  5. Possible sleep apnea, increased daytime somnolence, inability to lose weight.  I think she will consider doing sleep apnea test with Dr. Bond.  6. I will be happy to serve her, but she will continue to follow with Dr. Bond.    Noah Attestation  By signing my name below, IGabriella Scribe attest that this documentation has been prepared under the direction and in the presence of  Andria Julio MD.

## 2024-08-13 ENCOUNTER — APPOINTMENT (OUTPATIENT)
Dept: ENDOCRINOLOGY | Facility: CLINIC | Age: 44
End: 2024-08-13
Payer: COMMERCIAL

## 2024-08-13 VITALS — HEIGHT: 64 IN | BODY MASS INDEX: 50.02 KG/M2 | WEIGHT: 293 LBS

## 2024-08-13 DIAGNOSIS — R06.83 SNORING: ICD-10-CM

## 2024-08-13 DIAGNOSIS — E66.01 CLASS 3 SEVERE OBESITY WITHOUT SERIOUS COMORBIDITY WITH BODY MASS INDEX (BMI) OF 50.0 TO 59.9 IN ADULT, UNSPECIFIED OBESITY TYPE (MULTI): Primary | ICD-10-CM

## 2024-08-13 PROCEDURE — 3008F BODY MASS INDEX DOCD: CPT | Performed by: NURSE PRACTITIONER

## 2024-08-13 PROCEDURE — 99204 OFFICE O/P NEW MOD 45 MIN: CPT | Performed by: NURSE PRACTITIONER

## 2024-08-13 PROCEDURE — 1036F TOBACCO NON-USER: CPT | Performed by: NURSE PRACTITIONER

## 2024-08-13 ASSESSMENT — ENCOUNTER SYMPTOMS
DIZZINESS: 0
NUMBNESS: 0
EYE DISCHARGE: 0
POLYDIPSIA: 0
FATIGUE: 0
FREQUENCY: 0
POLYPHAGIA: 0
SPEECH DIFFICULTY: 0
RHINORRHEA: 0
COUGH: 0
BACK PAIN: 0
ABDOMINAL DISTENTION: 0
FLANK PAIN: 0
ACTIVITY CHANGE: 0
EYE PAIN: 0
CONSTIPATION: 0
HEADACHES: 0
DIARRHEA: 0
PHOTOPHOBIA: 0

## 2024-08-13 ASSESSMENT — PATIENT HEALTH QUESTIONNAIRE - PHQ9
SUM OF ALL RESPONSES TO PHQ9 QUESTIONS 1 AND 2: 0
1. LITTLE INTEREST OR PLEASURE IN DOING THINGS: NOT AT ALL
2. FEELING DOWN, DEPRESSED OR HOPELESS: NOT AT ALL

## 2024-08-13 NOTE — PATIENT INSTRUCTIONS
Physical Fitness: goal of working up to 5 days a week of the strength style work out with either swimming of free weights. 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 Continue the walking and cardio.  Nutrition- consult with Sandrine, read through booklet. Focus: portions for snacks  Medications- I will attempt to send the medication list for obesity medications  [facility] [employer] where you can obtain Wegovy (semaglutide) or Zepbound (Tirzepatide) for 500 dollars  Let me know the direction you desire to go with medications. We could consider Qsymia and Contrave if the GLP1 is not financially conducive at this time.

## 2024-08-13 NOTE — PROGRESS NOTES
"This is a virtual visit where physical exam is limited and ROS and hx is obtained.    Lindsey Sage presents for weight loss management and obesity consult today. Self referral into the program.    Obesity History:  Childhood or teen obesity history: yes  Age of Onset: childhood  Lowest adult weight: 260  Highest adult weight: 340  Current weight: 318   She states she was on Ozempic and lost weight on it to current weight (from 340 to 318)    Family history of obesity: yes    Biggest challenge with weight management:   Unsure    History of Weight Loss Efforts: yes  Successful weight loss techniques attempted: Weight Watchers and GLP1 (Ozempic)  Unsuccessful weight loss techniques attempted:  nothing    Current typical daily diet:  Typical Breakfast: protein shake, Gluten free bagel  Typical Lunch: sandwich, salad, chips, fruit  Typical Dinner: chicken, ground turkey  Soda/latte weekly intake: 1 per week at most  Take out/carry out/fast food weekly:  Portion sizes/seconds with meals: medium    Snacking  Daytime snacks: yes  Evening snacks: yes      Describe Appetite (always hungry/no hunger/average): not hungry  Are you full after a meal?  yes  Emotional eater? Yes   Boredom eater? Yes     Current Exercise Habits  \"I was going to [employer] and swim and walk, but I had to break short period and now trying to return\"    Sleep patterns (insomnia, waking in night) /hours of sleep per night: snoring  H/O Sleep apnea: no  Well rested?  generally    Increased Stressors (describe daily stress): yes      Any intake of an appetite suppressant or an anti-obesity medicine? No     H/O Pancreatitis: no  H/O Thyroid Medullary Cancer: no    Past Medical History:   Diagnosis Date    Anxiety 08/08/2024    COVID     Gastroesophageal reflux disease 08/08/2024    Morbid obesity due to excess calories (Multi) 03/01/2017    Obesity, Class III, BMI 40-49.9 (morbid obesity) (Multi) 01/22/2024       Current Outpatient Medications " "  Medication Sig Dispense Refill    amoxicillin-pot clavulanate (Augmentin) 875-125 mg tablet Take 1 tablet (875 mg) by mouth 2 times a day for 10 days. 20 tablet 0    busPIRone (Buspar) 10 mg tablet Take 1 tablet (10 mg) by mouth 3 times a day.      famotidine (Pepcid) 20 mg tablet Take 1 tablet (20 mg) by mouth once daily.      ferrous sulfate 325 (65 Fe) MG EC tablet Take 65 mg by mouth 3 times daily (morning, midday, late afternoon). Do not crush, chew, or split.      loratadine (Claritin) 10 mg tablet Take 1 tablet (10 mg) by mouth once daily. 30 tablet 0    pantoprazole (ProtoNix) 40 mg EC tablet Take 1 tablet (40 mg) by mouth once daily in the morning. Take before meals. Do not crush, chew, or split.      triamcinolone (Nasacort) 55 mcg nasal inhaler Administer 2 sprays into each nostril once daily. 16.5 g 0     No current facility-administered medications for this visit.           Review of Systems  Review of Systems   Constitutional:  Negative for activity change and fatigue.   HENT:  Negative for congestion, dental problem, ear pain, mouth sores and rhinorrhea.    Eyes:  Negative for photophobia, pain and discharge.   Respiratory:  Negative for cough.    Cardiovascular:  Negative for leg swelling.   Gastrointestinal:  Negative for abdominal distention, constipation and diarrhea.   Endocrine: Negative for polydipsia, polyphagia and polyuria.   Genitourinary:  Negative for flank pain, frequency and urgency.   Musculoskeletal:  Negative for back pain.   Skin:  Negative for pallor and rash.   Neurological:  Negative for dizziness, speech difficulty, numbness and headaches.   Psychiatric/Behavioral:  Negative for behavioral problems.            Objective   Ht 1.626 m (5' 4\")   Wt 144 kg (318 lb)   LMP 08/11/2024 (Exact Date)   BMI 54.58 kg/m²     Physical Exam  Physical Exam  Constitutional:       Appearance: Normal appearance. She is obese.      Comments: Talkative, engaging   Neurological:      Mental " "Status: She is alert and oriented to person, place, and time.   Psychiatric:         Mood and Affect: Mood normal.         Behavior: Behavior normal.         Thought Content: Thought content normal.         Judgment: Judgment normal.         Lab Review  No results found for: \"HGBA1C\"  No results found for: \"LDLCALC\"    Weight Trends  Trends of weight reviewed in visit, see graph below:  Wt Readings from Last 3 Encounters:   08/13/24 144 kg (318 lb)   08/08/24 144 kg (318 lb)   06/26/24 143 kg (315 lb)   (  Assessment/Plan     Follow up and Goals:    Physical Fitness:  of  working up to 5 days a week of the strength style work out with either swimming of free weights. 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 Continue the walking and cardio.  Nutrition- consult with Sandrine, read through booklet. Focus: portions for snacks  Medications- I will attempt to send the medication list for obesity medications to you via [employer] where you can obtain Wegovy (semaglutide) or Zepbound (tirzepatride)We could consider Qsymia and Contrave if the GLP1 is not financially conducive at this time.  Let me know the direction you desire to go with medications. We could consider Qsymia and Contrave if the GLP1 is not financially conducive at this time.       Problem List Items Addressed This Visit    None      Diet interventions: referral to dietitian for guidance in these changes and Mediterranean  Discussed Mediterranean Diet, given pamphlet or it will be mailed, we looked at ADA plate, portion sizes, recipes. Advised to review in preparation for nutrition consult    Handouts given:  yes    Exercise intervention:   We discussed the importance of incorporating resistance and free weight  lifting into physical activity routine to prevent muscle wasting with weight loss, enhance bone health, the positive role increased muscle has in burning fat at rest. We looked at examples of these types of exercise routines online. Advised to do " modifications. Advised to check with PCP if there is a h/o musculoskeletal injury or hx. We discussed benefits of walking with weight loss and as a cardio form of exercise. Gradually increase exercise to a goal of 150 minutes at least per week.        Follow Up:  Referred to nutrition or continue to work with current dietitian   Discussed obesity medications, side effects and mechanism of action reviewed with patient  Discussed physical activity: we reviewed [employer] videos for strength training, advised to use modifications for these videos, we discussed benefits of strength training and resistance bands  on bone and muscle health, we discussed walking and water aerobic options for cardio  Discussed Mediterranean Diet, given pamphlet or it will be mailed, we looked at ADA plate, portion sizes, recipes. Advised to review Mediterranean Meal Plan booklet  in preparation for nutrition consult  ....  1 month group visit and nutrition visit in person or  virtual  Please reach out if you need anything or have further questions

## 2024-08-16 ENCOUNTER — APPOINTMENT (OUTPATIENT)
Dept: OBSTETRICS AND GYNECOLOGY | Facility: CLINIC | Age: 44
End: 2024-08-16
Payer: COMMERCIAL

## 2024-09-11 ENCOUNTER — APPOINTMENT (OUTPATIENT)
Dept: RADIOLOGY | Facility: HOSPITAL | Age: 44
End: 2024-09-11
Payer: COMMERCIAL

## 2024-09-11 DIAGNOSIS — Z12.31 SCREENING MAMMOGRAM FOR BREAST CANCER: ICD-10-CM

## 2024-09-25 ENCOUNTER — HOSPITAL ENCOUNTER (EMERGENCY)
Facility: HOSPITAL | Age: 44
Discharge: HOME | End: 2024-09-25
Attending: EMERGENCY MEDICINE
Payer: COMMERCIAL

## 2024-09-25 ENCOUNTER — TELEMEDICINE (OUTPATIENT)
Dept: PRIMARY CARE | Facility: CLINIC | Age: 44
End: 2024-09-25
Payer: COMMERCIAL

## 2024-09-25 ENCOUNTER — APPOINTMENT (OUTPATIENT)
Dept: RADIOLOGY | Facility: HOSPITAL | Age: 44
End: 2024-09-25
Payer: COMMERCIAL

## 2024-09-25 ENCOUNTER — APPOINTMENT (OUTPATIENT)
Dept: CARDIOLOGY | Facility: HOSPITAL | Age: 44
End: 2024-09-25
Payer: COMMERCIAL

## 2024-09-25 VITALS
HEIGHT: 64 IN | BODY MASS INDEX: 50.02 KG/M2 | RESPIRATION RATE: 16 BRPM | OXYGEN SATURATION: 100 % | TEMPERATURE: 98.6 F | DIASTOLIC BLOOD PRESSURE: 84 MMHG | WEIGHT: 293 LBS | HEART RATE: 81 BPM | SYSTOLIC BLOOD PRESSURE: 139 MMHG

## 2024-09-25 DIAGNOSIS — R07.9 CHEST PAIN, UNSPECIFIED TYPE: Primary | ICD-10-CM

## 2024-09-25 DIAGNOSIS — B37.9 YEAST INFECTION: Primary | ICD-10-CM

## 2024-09-25 DIAGNOSIS — I10 HYPERTENSION, UNSPECIFIED TYPE: ICD-10-CM

## 2024-09-25 LAB
ALBUMIN SERPL BCP-MCNC: 4.2 G/DL (ref 3.4–5)
ALP SERPL-CCNC: 62 U/L (ref 33–110)
ALT SERPL W P-5'-P-CCNC: 13 U/L (ref 7–45)
ANION GAP SERPL CALCULATED.3IONS-SCNC: 10 MMOL/L (ref 10–20)
APPEARANCE UR: CLEAR
AST SERPL W P-5'-P-CCNC: 11 U/L (ref 9–39)
BACTERIA #/AREA URNS AUTO: ABNORMAL /HPF
BASOPHILS # BLD AUTO: 0.02 X10*3/UL (ref 0–0.1)
BASOPHILS NFR BLD AUTO: 0.2 %
BILIRUB SERPL-MCNC: 0.4 MG/DL (ref 0–1.2)
BILIRUB UR STRIP.AUTO-MCNC: NEGATIVE MG/DL
BUN SERPL-MCNC: 13 MG/DL (ref 6–23)
CALCIUM SERPL-MCNC: 9.3 MG/DL (ref 8.6–10.3)
CARDIAC TROPONIN I PNL SERPL HS: <3 NG/L (ref 0–13)
CARDIAC TROPONIN I PNL SERPL HS: <3 NG/L (ref 0–13)
CHLORIDE SERPL-SCNC: 105 MMOL/L (ref 98–107)
CO2 SERPL-SCNC: 27 MMOL/L (ref 21–32)
COLOR UR: ABNORMAL
CREAT SERPL-MCNC: 0.68 MG/DL (ref 0.5–1.05)
EGFRCR SERPLBLD CKD-EPI 2021: >90 ML/MIN/1.73M*2
EOSINOPHIL # BLD AUTO: 0.03 X10*3/UL (ref 0–0.7)
EOSINOPHIL NFR BLD AUTO: 0.3 %
ERYTHROCYTE [DISTWIDTH] IN BLOOD BY AUTOMATED COUNT: 17 % (ref 11.5–14.5)
GLUCOSE SERPL-MCNC: 95 MG/DL (ref 74–99)
GLUCOSE UR STRIP.AUTO-MCNC: NEGATIVE MG/DL
HCG UR QL IA.RAPID: NEGATIVE
HCT VFR BLD AUTO: 42.2 % (ref 36–46)
HGB BLD-MCNC: 13.9 G/DL (ref 12–16)
HOLD SPECIMEN: NORMAL
IMM GRANULOCYTES # BLD AUTO: 0.01 X10*3/UL (ref 0–0.7)
IMM GRANULOCYTES NFR BLD AUTO: 0.1 % (ref 0–0.9)
KETONES UR STRIP.AUTO-MCNC: NEGATIVE MG/DL
LEUKOCYTE ESTERASE UR QL STRIP.AUTO: NEGATIVE
LIPASE SERPL-CCNC: 25 U/L (ref 9–82)
LYMPHOCYTES # BLD AUTO: 1.45 X10*3/UL (ref 1.2–4.8)
LYMPHOCYTES NFR BLD AUTO: 16.8 %
MCH RBC QN AUTO: 27.5 PG (ref 26–34)
MCHC RBC AUTO-ENTMCNC: 32.9 G/DL (ref 32–36)
MCV RBC AUTO: 84 FL (ref 80–100)
MONOCYTES # BLD AUTO: 0.54 X10*3/UL (ref 0.1–1)
MONOCYTES NFR BLD AUTO: 6.3 %
NEUTROPHILS # BLD AUTO: 6.59 X10*3/UL (ref 1.2–7.7)
NEUTROPHILS NFR BLD AUTO: 76.3 %
NITRITE UR QL STRIP.AUTO: NEGATIVE
NRBC BLD-RTO: 0 /100 WBCS (ref 0–0)
PH UR STRIP.AUTO: 8.5 [PH]
PLATELET # BLD AUTO: 225 X10*3/UL (ref 150–450)
POTASSIUM SERPL-SCNC: 4 MMOL/L (ref 3.5–5.3)
PROT SERPL-MCNC: 7.1 G/DL (ref 6.4–8.2)
PROT UR STRIP.AUTO-MCNC: NEGATIVE MG/DL
RBC # BLD AUTO: 5.05 X10*6/UL (ref 4–5.2)
RBC # UR STRIP.AUTO: ABNORMAL /UL
RBC #/AREA URNS AUTO: ABNORMAL /HPF
SODIUM SERPL-SCNC: 138 MMOL/L (ref 136–145)
SP GR UR STRIP.AUTO: 1.01
SQUAMOUS #/AREA URNS AUTO: ABNORMAL /HPF
UROBILINOGEN UR STRIP.AUTO-MCNC: 0.2 MG/DL
WBC # BLD AUTO: 8.6 X10*3/UL (ref 4.4–11.3)
WBC #/AREA URNS AUTO: ABNORMAL /HPF

## 2024-09-25 PROCEDURE — 76705 ECHO EXAM OF ABDOMEN: CPT | Performed by: RADIOLOGY

## 2024-09-25 PROCEDURE — 71045 X-RAY EXAM CHEST 1 VIEW: CPT

## 2024-09-25 PROCEDURE — 71045 X-RAY EXAM CHEST 1 VIEW: CPT | Performed by: RADIOLOGY

## 2024-09-25 PROCEDURE — 84484 ASSAY OF TROPONIN QUANT: CPT | Performed by: EMERGENCY MEDICINE

## 2024-09-25 PROCEDURE — 96374 THER/PROPH/DIAG INJ IV PUSH: CPT | Performed by: EMERGENCY MEDICINE

## 2024-09-25 PROCEDURE — 1036F TOBACCO NON-USER: CPT | Performed by: NURSE PRACTITIONER

## 2024-09-25 PROCEDURE — 36415 COLL VENOUS BLD VENIPUNCTURE: CPT | Performed by: EMERGENCY MEDICINE

## 2024-09-25 PROCEDURE — 80053 COMPREHEN METABOLIC PANEL: CPT | Performed by: EMERGENCY MEDICINE

## 2024-09-25 PROCEDURE — 81025 URINE PREGNANCY TEST: CPT | Performed by: EMERGENCY MEDICINE

## 2024-09-25 PROCEDURE — 76705 ECHO EXAM OF ABDOMEN: CPT

## 2024-09-25 PROCEDURE — 83690 ASSAY OF LIPASE: CPT | Performed by: EMERGENCY MEDICINE

## 2024-09-25 PROCEDURE — 93005 ELECTROCARDIOGRAM TRACING: CPT

## 2024-09-25 PROCEDURE — 99285 EMERGENCY DEPT VISIT HI MDM: CPT | Mod: 25 | Performed by: EMERGENCY MEDICINE

## 2024-09-25 PROCEDURE — 85025 COMPLETE CBC W/AUTO DIFF WBC: CPT | Performed by: EMERGENCY MEDICINE

## 2024-09-25 PROCEDURE — 96361 HYDRATE IV INFUSION ADD-ON: CPT | Performed by: EMERGENCY MEDICINE

## 2024-09-25 PROCEDURE — 96375 TX/PRO/DX INJ NEW DRUG ADDON: CPT | Performed by: EMERGENCY MEDICINE

## 2024-09-25 PROCEDURE — 81001 URINALYSIS AUTO W/SCOPE: CPT | Performed by: EMERGENCY MEDICINE

## 2024-09-25 PROCEDURE — 2500000004 HC RX 250 GENERAL PHARMACY W/ HCPCS (ALT 636 FOR OP/ED): Performed by: EMERGENCY MEDICINE

## 2024-09-25 PROCEDURE — 99213 OFFICE O/P EST LOW 20 MIN: CPT | Performed by: NURSE PRACTITIONER

## 2024-09-25 RX ORDER — MORPHINE SULFATE 4 MG/ML
4 INJECTION, SOLUTION INTRAMUSCULAR; INTRAVENOUS ONCE
Status: DISCONTINUED | OUTPATIENT
Start: 2024-09-25 | End: 2024-09-25 | Stop reason: HOSPADM

## 2024-09-25 RX ORDER — FAMOTIDINE 10 MG/ML
20 INJECTION INTRAVENOUS ONCE
Status: COMPLETED | OUTPATIENT
Start: 2024-09-25 | End: 2024-09-25

## 2024-09-25 RX ORDER — ONDANSETRON HYDROCHLORIDE 2 MG/ML
4 INJECTION, SOLUTION INTRAVENOUS ONCE
Status: COMPLETED | OUTPATIENT
Start: 2024-09-25 | End: 2024-09-25

## 2024-09-25 RX ORDER — KETOROLAC TROMETHAMINE 30 MG/ML
15 INJECTION, SOLUTION INTRAMUSCULAR; INTRAVENOUS ONCE
Status: COMPLETED | OUTPATIENT
Start: 2024-09-25 | End: 2024-09-25

## 2024-09-25 RX ORDER — FLUCONAZOLE 150 MG/1
150 TABLET ORAL ONCE
Qty: 2 TABLET | Refills: 0 | Status: SHIPPED | OUTPATIENT
Start: 2024-09-25 | End: 2024-09-25

## 2024-09-25 ASSESSMENT — PAIN SCALES - GENERAL
PAINLEVEL_OUTOF10: 0 - NO PAIN
PAINLEVEL_OUTOF10: 0 - NO PAIN

## 2024-09-25 ASSESSMENT — LIFESTYLE VARIABLES
TOTAL SCORE: 0
EVER FELT BAD OR GUILTY ABOUT YOUR DRINKING: NO
HAVE PEOPLE ANNOYED YOU BY CRITICIZING YOUR DRINKING: NO
EVER HAD A DRINK FIRST THING IN THE MORNING TO STEADY YOUR NERVES TO GET RID OF A HANGOVER: NO
HAVE YOU EVER FELT YOU SHOULD CUT DOWN ON YOUR DRINKING: NO

## 2024-09-25 ASSESSMENT — PAIN - FUNCTIONAL ASSESSMENT
PAIN_FUNCTIONAL_ASSESSMENT: 0-10
PAIN_FUNCTIONAL_ASSESSMENT: 0-10

## 2024-09-25 NOTE — DISCHARGE INSTRUCTIONS
Follow-up with your primary care physician within 1 to 2 days for further management of your current symptoms and repeat check of your blood pressure.      Follow-up with gastroenterology and cardiology as scheduled      Return to the emergency department sooner with worsening of symptoms or onset of new symptoms

## 2024-09-25 NOTE — Clinical Note
Lindsey Sage was seen and treated in our emergency department on 9/25/2024.  She may return to work on 09/26/2024.       If you have any questions or concerns, please don't hesitate to call.      Yumi Mendez MD

## 2024-09-25 NOTE — ED PROVIDER NOTES
HPI   Chief Complaint   Patient presents with    GERD       This is a 43-year-old female presenting to the emergency department with complaints of possible GERD.  Patient does have a history of GERD, takes famotidine and pantoprazole daily.  She states that over the last week she has been having increasing burning sensation in her chest radiating to her shoulders bilaterally.  She has had some intermittent nausea over the last 2 days as well.  She does not have any previous history of myocardial infarction's however she does see a cardiologist for family history.  She does not have any abdominal discomfort.  No previous history of abdominal surgeries other than a  section.  No pain in her back.        Please see HPI for pertinent positive and negative ROS.      Patient History   Past Medical History:   Diagnosis Date    Anxiety 2024    COVID     Gastroesophageal reflux disease 2024    Morbid obesity due to excess calories (Multi) 2017    Obesity, Class III, BMI 40-49.9 (morbid obesity) (Multi) 2024     Past Surgical History:   Procedure Laterality Date     SECTION, CLASSIC       Family History   Problem Relation Name Age of Onset    Diabetes Mother      Other (COVID.) Mother      Other (heart problem) Father       Social History     Tobacco Use    Smoking status: Never    Smokeless tobacco: Never   Substance Use Topics    Alcohol use: Not on file    Drug use: Never       Physical Exam   ED Triage Vitals [24 0845]   Temperature Heart Rate Respirations BP   37 °C (98.6 °F) 85 16 (!) 152/104      Pulse Ox Temp Source Heart Rate Source Patient Position   100 % Temporal Radial Sitting      BP Location FiO2 (%)     Left arm --       Physical Exam  GENERAL APPEARANCE: Awake and alert. No acute respiratory distress.  Patient is talking in smooth nondyspneic sentences.  VITAL SIGNS: As per the nurses' triage record.  HEENT: Normocephalic, atraumatic. Extraocular muscles are  intact. Conjunctiva are pink. Negative scleral icterus. Mucous membranes are moist. Tongue in the midline.  NECK: Soft, nontender and supple, full gross ROM, no meningeal signs.  CHEST: Nontender to palpation. Clear to auscultation bilaterally.  Symmetric rise and fall of chest wall.   HEART: Clear S1 and S2. Regular rate and rhythm.  Strong and equal pulses in the extremities.  ABDOMEN: Soft, nontender, nondistended  MUSCULOSKELETAL: Full gross active range of motion. Ambulating on own with no acute difficulties  NEUROLOGICAL: Awake, alert and oriented x 3. Motor power intact in the upper and lower extremities. Sensation is intact to light touch in the upper and lower extremities. Patient answering questions appropriately.   IMMUNOLOGICAL: No lymphatic streaking noted  DERMATOLOGIC: Warm and dry without petechiae, rashes, or ecchymosis noted on visible skin.   PYSCH: Cooperative with appropriate mood and affect.    ED Course & MDM   Diagnoses as of 09/25/24 1355   Chest pain, unspecified type   Hypertension, unspecified type                 No data recorded     Rowan Coma Scale Score: 15 (09/25/24 0836 : Nyla Navarrete RN)                           Medical Decision Making  Parts of this chart have been completed using voice recognition software. Please excuse any errors of transcription.  My thought process and reason for plan has been formulated from the time that I saw the patient until the time of disposition and is not specific to one specific moment during their visit and furthermore my MDM encompasses this entire chart and not only this text box.      HPI: Detailed above.    Exam: A medically appropriate exam performed, outlined above, given the known history and presentation.    History obtained from: Patient    EKG: See my supervising physician's EKG interpretation      Medications given during visit:  Medications   morphine injection 4 mg (4 mg intravenous Not Given 9/25/24 1335)   famotidine PF (Pepcid)  injection 20 mg (20 mg intravenous Given 9/25/24 1026)   sodium chloride 0.9 % bolus 1,000 mL (0 mL intravenous Stopped 9/25/24 1335)   ondansetron (Zofran) injection 4 mg (4 mg intravenous Given 9/25/24 1025)   ketorolac (Toradol) injection 15 mg (15 mg intravenous Given 9/25/24 1026)        Diagnostic/tests  Labs Reviewed   CBC WITH AUTO DIFFERENTIAL - Abnormal       Result Value    WBC 8.6      nRBC 0.0      RBC 5.05      Hemoglobin 13.9      Hematocrit 42.2      MCV 84      MCH 27.5      MCHC 32.9      RDW 17.0 (*)     Platelets 225      Neutrophils % 76.3      Immature Granulocytes %, Automated 0.1      Lymphocytes % 16.8      Monocytes % 6.3      Eosinophils % 0.3      Basophils % 0.2      Neutrophils Absolute 6.59      Immature Granulocytes Absolute, Automated 0.01      Lymphocytes Absolute 1.45      Monocytes Absolute 0.54      Eosinophils Absolute 0.03      Basophils Absolute 0.02     URINALYSIS WITH REFLEX CULTURE AND MICROSCOPIC - Abnormal    Color, Urine Straw      Appearance, Urine Clear      Specific Gravity, Urine 1.015      pH, Urine 8.5 (*)     Protein, Urine NEGATIVE      Glucose, Urine NEGATIVE      Blood, Urine TRACE (*)     Ketones, Urine NEGATIVE      Bilirubin, Urine NEGATIVE      Urobilinogen, Urine 0.2      Nitrite, Urine NEGATIVE      Leukocyte Esterase, Urine NEGATIVE     URINALYSIS MICROSCOPIC WITH REFLEX CULTURE - Abnormal    WBC, Urine NONE      RBC, Urine 1-2      Squamous Epithelial Cells, Urine 1-9 (SPARSE)      Bacteria, Urine 1+ (*)    HCG, URINE, QUALITATIVE - Normal    HCG, Urine NEGATIVE     COMPREHENSIVE METABOLIC PANEL - Normal    Glucose 95      Sodium 138      Potassium 4.0      Chloride 105      Bicarbonate 27      Anion Gap 10      Urea Nitrogen 13      Creatinine 0.68      eGFR >90      Calcium 9.3      Albumin 4.2      Alkaline Phosphatase 62      Total Protein 7.1      AST 11      Bilirubin, Total 0.4      ALT 13     LIPASE - Normal    Lipase 25      Narrative:      Venipuncture immediately after or during the administration of Metamizole may lead to falsely low results. Testing should be performed immediately prior to Metamizole dosing.   SERIAL TROPONIN-INITIAL - Normal    Troponin I, High Sensitivity <3      Narrative:     Less than 99th percentile of normal range cutoff-  Female and children under 18 years old <14 ng/L; Male <21 ng/L: Negative  Repeat testing should be performed if clinically indicated.     Female and children under 18 years old 14-50 ng/L; Male 21-50 ng/L:  Consistent with possible cardiac damage and possible increased clinical   risk. Serial measurements may help to assess extent of myocardial damage.     >50 ng/L: Consistent with cardiac damage, increased clinical risk and  myocardial infarction. Serial measurements may help assess extent of   myocardial damage.      NOTE: Children less than 1 year old may have higher baseline troponin   levels and results should be interpreted in conjunction with the overall   clinical context.     NOTE: Troponin I testing is performed using a different   testing methodology at [facility] than at other   [facility]. Direct result comparisons should only   be made within the same method.   SERIAL TROPONIN, 1 HOUR - Normal    Troponin I, High Sensitivity <3      Narrative:     Less than 99th percentile of normal range cutoff-  Female and children under 18 years old <14 ng/L; Male <21 ng/L: Negative  Repeat testing should be performed if clinically indicated.     Female and children under 18 years old 14-50 ng/L; Male 21-50 ng/L:  Consistent with possible cardiac damage and possible increased clinical   risk. Serial measurements may help to assess extent of myocardial damage.     >50 ng/L: Consistent with cardiac damage, increased clinical risk and  myocardial infarction. Serial measurements may help assess extent of   myocardial damage.      NOTE: Children less than 1 year old may have higher baseline  troponin   levels and results should be interpreted in conjunction with the overall   clinical context.     NOTE: Troponin I testing is performed using a different   testing methodology at [facility] than at other   [facility]. Direct result comparisons should only   be made within the same method.   TROPONIN SERIES- (INITIAL, 1 HR)    Narrative:     The following orders were created for panel order Troponin I Series, High Sensitivity (0, 1 HR).  Procedure                               Abnormality         Status                     ---------                               -----------         ------                     Troponin I, High Sensiti...[[id number]]  Normal              Final result               Troponin, High Sensitivi...[[id number]]  Normal              Final result                 Please view results for these tests on the individual orders.   URINALYSIS WITH REFLEX CULTURE AND MICROSCOPIC    Narrative:     The following orders were created for panel order Urinalysis with Reflex Culture and Microscopic.  Procedure                               Abnormality         Status                     ---------                               -----------         ------                     Urinalysis with Reflex C...[[id number]]  Abnormal            Final result               Extra Urine Gray Tube[[id number]]                            In process                   Please view results for these tests on the individual orders.   EXTRA URINE GRAY TUBE      US gallbladder   Final Result   Somewhat limited grossly unremarkable gallbladder ultrasound   examination.        MACRO:   None.        Signed by: Lindsey Rodas 9/25/2024 9:56 AM   Dictation workstation:   [id number]      XR chest 1 view   Final Result   Allowing for the aforementioned limitation, unremarkable chest.        Signed by: Placido Leon 9/25/2024 9:55 AM   Dictation workstation:   [id number]           Considerations/further MDM:  Patient  was seen in conjucntion with my supervising physician,  Dr. Mendez. Please refer to her note.    Blood pressure 152/104, temperature 98.6 °F, heart rate 85 bpm, respirations 16, 100% room air    Differential diagnosis includes was not limited to GERD versus gastritis versus pancreatitis versus cholecystitis versus ACS    Patient was treated with IV normal saline 1 L, IV famotidine, IV Toradol, IV Zofran    Initial troponin  I less than 3, repeat troponin I less than 3.  CMP unremarkable including liver enzymes.  Urinalysis shows no evidence of urinary tract infection.  Lipase 25.  CBC shows no leukocytosis or anemia.  Ultrasound of the gallbladder shows no acute findings.  Chest x-ray shows no acute cardiopulmonary process.  Patient symptoms improved with IV medications.  Her heart score is 0.      Patient was educated to follow-up with her gastroenterologist, her cardiologist and her primary care doctor.  She was educated to continue famotidine pantoprazole.  She was given strict return precautions to the emergency department.  Patient verbalized understanding felt comfortable discharge plan home.  She was discharged in stable condition.      Procedure  Procedures     Alexia Nieves PA-C  09/25/24 9595

## 2024-09-25 NOTE — PROGRESS NOTES
Attestation/Supervisory note for SHREYA Nieves      The patient is a 43-year-old female presenting to the emergency department for evaluation of indigestion/heartburn with nausea.  She states that she has had symptoms for the past week or so.  She states that it has been gradually worsening.  She states that she was not able to get an appoint with her primary care physician so she came to the emergency room today for her symptoms.  She states that the pain is most consistent with indigestion.  She does have a history of reflux.  She states it is a dull aching/burning pain.  No better or worse.  No radiation.  It is fairly constant.  She denies any headache or visual changes.  No neck or back pain.  No palpitations.  No diaphoresis.  No shortness of breath.  No abdominal pain other than having the heartburn in the substernal region of her chest in the upper abdomen.  No diarrhea or constipation.  No urinary complaints.  No vaginal discharge.  No history of CAD or ACS.  No history of PE or DVT.  No history of hypertension, hyperlipidemia or diabetes.  She does not smoke or drink.  All pertinent positives and negatives are recorded above.  All other systems reviewed and otherwise negative.  Vital signs with hypertension but otherwise within normal limits.  Physical exam with a well-nourished well-developed female in no acute distress.  HEENT exam within normal limits.  She has no evidence of airway compromise or respiratory distress.  Abdominal exam is benign.  She does not have any gross motor, neurologic or vascular deficits on exam.  Pulses are equal bilaterally.      EKG with normal sinus rhythm at 85 bpm, LVH criteria, normal axis, normal ST segment, no diffuse flattening of the T waves      IV fluids, IV Pepcid, IV Toradol, IV morphine and IV Zofran ordered.      Diagnostic labs with microscopic hematuria but otherwise unremarkable.      Initial troponin < 3.  Repeat troponin < 3      Heart score 1      US  gallbladder   Final Result   Somewhat limited grossly unremarkable gallbladder ultrasound   examination.        MACRO:   None.        Signed by: Lindsey Rodas 9/25/2024 9:56 AM   Dictation workstation:   [id number]      XR chest 1 view   Final Result   Allowing for the aforementioned limitation, unremarkable chest.        Signed by: Placido Ayonrinku 9/25/2024 9:55 AM   Dictation workstation:   [id number]           The patient does not have any evidence of hemodynamic instability while in the emergency room.  She does not have any evidence of airway compromise or respiratory distress on exam.  She does not have any evidence of ischemia on EKG or cardiac enzymes.  No events on telemetry.  Chest x-ray without acute process.  No evidence of pneumonia or pneumothorax.  No evidence of CHF.  No widening of the mediastinum.  Her pulses are equal bilaterally.  The right upper quadrant ultrasound shows no evidence of cholecystitis, cholelithiasis or choledocholithiasis.  She does not have any evidence of surgical abdomen on exam.      The patient was released in good condition.  She was instructed to follow-up with her primary care physician within 1 to 2 days for further management of her current symptoms.  She will return to the emergency department sooner with worsening of symptoms or onset of new symptoms.  She was also given a referral to gastroenterology and cardiology for further management of her sx      Impression/diagnosis:  1.  Substernal chest pain  2.  Epigastric abdominal pain  3.  Hypertension, unspecified  4.  Nausea without vomiting      I personally saw the patient and made/approve the management plan and take responsibility for the patient management.      I independently interpreted the following study (S) EKG and diagnostic labs      I personally discussed the patient's management with the patient      I reviewed the results of the diagnostic labs and diagnostic imaging.  Formal radiology read was  completed by the radiologist.      Yumi Mendez MD

## 2024-09-26 ENCOUNTER — PATIENT OUTREACH (OUTPATIENT)
Dept: CARE COORDINATION | Facility: CLINIC | Age: 44
End: 2024-09-26
Payer: COMMERCIAL

## 2024-09-26 LAB
ATRIAL RATE: 85 BPM
P AXIS: 23 DEGREES
P OFFSET: 201 MS
P ONSET: 148 MS
PR INTERVAL: 142 MS
Q ONSET: 219 MS
QRS COUNT: 14 BEATS
QRS DURATION: 76 MS
QT INTERVAL: 350 MS
QTC CALCULATION(BAZETT): 416 MS
QTC FREDERICIA: 393 MS
R AXIS: 9 DEGREES
T AXIS: 3 DEGREES
T OFFSET: 394 MS
VENTRICULAR RATE: 85 BPM

## 2024-09-26 SDOH — ECONOMIC STABILITY: FOOD INSECURITY
ARE ANY OF YOUR NEEDS URGENT? FOR EXAMPLE, UNCERTAINTY OF WHERE YOU WILL GET YOUR NEXT MEAL OR NOT HAVING THE MEDICATIONS YOU NEED TO TAKE TOMORROW.: NO

## 2024-09-26 SDOH — ECONOMIC STABILITY: GENERAL: WOULD YOU LIKE HELP WITH ANY OF THE FOLLOWING NEEDS?: I DONT NEED HELP WITH ANY OF THESE

## 2024-09-26 NOTE — PROGRESS NOTES
Subjective   Patient ID: Lnidsey Sage is a 43 y.o. female who presents for Results.    This visit was completed via [employer] due to the restrictions of the COVID-19 pandemic. All issues as below were discussed and addressed but no physical exam was performed. If it was felt that the patient should be evaluated in clinic than they were directed there. The patient verbally consented to the visit.  Patient is located in [place] and verified   Would like to review urine test results from today  Has had vaginal itching, foul odor, and vaginal burning.  UA was negative for UTI         Review of Systems   All other systems reviewed and are negative.      Objective   There were no vitals taken for this visit.    Physical Exam    Assessment/Plan   Problem List Items Addressed This Visit             ICD-10-CM    Yeast infection - Primary B37.9     Fluconazole - if no improvement in 3 days may repeat  Reviewed UA results  Questions answered          Relevant Medications    fluconazole (Diflucan) 150 mg tablet

## 2024-09-26 NOTE — PROGRESS NOTES
Outreach call to patient to support a smooth transition of care from recent admission.  Spoke with patient, reviewed discharge medications, discharge instructions, assessed social needs, and provided education on importance of follow-up appointment with provider. Offered patient advocate line to patient if she had some concerns about her care that was given. Patient declined. Listened and support patient. Patient has all of her follow ups scheduled at this time.       Patient was seen for chief compliant of -      Chest pain, unspecified type    Hypertension, unspecified type-  Patient was educated to follow-up with her gastroenterologist, her cardiologist and her primary care doctor. She was educated to continue famotidine pantoprazole. She was given strict return precautions to the emergency department.         Patient was provided a work excuse  Follow-Ups     Schedule an appointment with Tori Saucedo MD (Internal Medicine) in 2 days (9/27/2024)  Schedule an appointment with Cassy Cotto MD (Gastroenterology) in 1 week (10/2/2024)  Schedule an appointment with Darian Goodman MD (Cardiology) in 1 week (10/2/2024)     Future Appointments      SEP 27  2024 09:45 AM - Cardiology Clinic Visit  [facility] - Darian Goodman MD      OCT 4  2024 01:30 PM - OBGYN New Patient Visit GYN  [facility] - Mena Carbajal PA-C      OCT 7  2024 03:15 PM - Virtual New Patient  [facility] - Sandrine Jain RD, LD      OCT 21  2024 06:00 PM - Endocrinology Group  [facility] - SAIGE Murillo-CNP      NOV 5 2024 03:30 PM - Breast Imaging Screening Bilateral  [facility] - MEHDI MAMMO         Thank you,           Thea Knox, RN   Nurse Care Manager   [facility] Accountable Care Department   [phone]

## 2024-09-27 ENCOUNTER — OFFICE VISIT (OUTPATIENT)
Dept: CARDIOLOGY | Facility: CLINIC | Age: 44
End: 2024-09-27
Payer: COMMERCIAL

## 2024-09-27 VITALS
HEART RATE: 68 BPM | OXYGEN SATURATION: 100 % | DIASTOLIC BLOOD PRESSURE: 90 MMHG | HEIGHT: 64 IN | SYSTOLIC BLOOD PRESSURE: 152 MMHG | BODY MASS INDEX: 50.02 KG/M2 | WEIGHT: 293 LBS

## 2024-09-27 DIAGNOSIS — Z82.49 FAMILY HISTORY OF MI (MYOCARDIAL INFARCTION): Primary | ICD-10-CM

## 2024-09-27 PROCEDURE — 3008F BODY MASS INDEX DOCD: CPT | Performed by: INTERNAL MEDICINE

## 2024-09-27 PROCEDURE — 1036F TOBACCO NON-USER: CPT | Performed by: INTERNAL MEDICINE

## 2024-09-27 PROCEDURE — 99214 OFFICE O/P EST MOD 30 MIN: CPT | Performed by: INTERNAL MEDICINE

## 2024-09-27 PROCEDURE — 99204 OFFICE O/P NEW MOD 45 MIN: CPT | Performed by: INTERNAL MEDICINE

## 2024-09-27 ASSESSMENT — COLUMBIA-SUICIDE SEVERITY RATING SCALE - C-SSRS
6. HAVE YOU EVER DONE ANYTHING, STARTED TO DO ANYTHING, OR PREPARED TO DO ANYTHING TO END YOUR LIFE?: NO
2. HAVE YOU ACTUALLY HAD ANY THOUGHTS OF KILLING YOURSELF?: NO
1. IN THE PAST MONTH, HAVE YOU WISHED YOU WERE DEAD OR WISHED YOU COULD GO TO SLEEP AND NOT WAKE UP?: NO

## 2024-09-27 ASSESSMENT — PAIN SCALES - GENERAL: PAINLEVEL: 0-NO PAIN

## 2024-09-27 NOTE — PROGRESS NOTES
"Primary Care Physician: Tori Saucedo MD  Date of Visit: 2024  9:45 AM EDT  [place] [facility] [place] [address]     Chief Complaint:   Chief Complaint   Patient presents with    Consult     Family history of heart problems    Leg Swelling     HPI / Summary:   Lindsey Sage is a 43 y.o. female presents for Consultation for left leg swelling     ROS    Medical History:   She has a past medical history of Anxiety (2024), COVID, Gastroesophageal reflux disease (2024), Morbid obesity due to excess calories (Multi) (2017), and Obesity, Class III, BMI 40-49.9 (morbid obesity) (Multi) (2024).  Surgical Hx:   She has a past surgical history that includes  section, classic.   Social Hx:   She reports that she has never smoked. She has never used smokeless tobacco. She reports that she does not use drugs. No history on file for alcohol use.  Family Hx:   Her family history includes COVID. in her mother; Diabetes in her mother; heart problem in her father.   Allergies:  Allergies   Allergen Reactions    Erythromycin Hives and Rash     Outpatient Medications:  Current Outpatient Medications   Medication Instructions    busPIRone (BUSPAR) 10 mg, oral, 2 times daily    famotidine (PEPCID) 20 mg, oral, 2 times daily    ferrous sulfate 65 mg, oral, Daily, Do not crush, chew, or split.    loratadine (CLARITIN) 10 mg, oral, Daily    pantoprazole (PROTONIX) 40 mg, oral, Daily before breakfast, Do not crush, chew, or split.    triamcinolone (Nasacort) 55 mcg nasal inhaler 2 sprays, Each Nostril, Daily     Physical Exam:  Vitals:    24 1007 24 1009   BP: 141/83 140/90   BP Location: Left arm Right arm   Patient Position: Sitting    BP Cuff Size: Large adult long    Pulse: 94 79   SpO2: 100%    Weight: 146 kg (322 lb 9.6 oz)    Height: 1.626 m (5' 4\")      Wt Readings from Last 5 Encounters:   24 146 kg (322 lb 9.6 oz)   24 144 kg (318 lb)   24 144 kg (318 lb) "   08/08/24 144 kg (318 lb)   06/26/24 143 kg (315 lb)     Physical Exam  JVP not elevated. Carotid impulses are 2+ without overlying bruit.   Chest exhibits fair to good air movement with completely clear breath sounds.   The cardiac rhythm is regular with no premature beats.   Normal S1 and S2. No gallop, murmur or rub, or click.   Abdomen is soft and benign without focal tenderness.   With no lower leg edema. The pedal pulses are intact.     Last Labs:  Admission on 09/25/2024, Discharged on 09/25/2024   Component Date Value    Ventricular Rate 09/25/2024 85     Atrial Rate 09/25/2024 85     CA Interval 09/25/2024 142     QRS Duration 09/25/2024 76     QT Interval 09/25/2024 350     QTC Calculation(Bazett) 09/25/2024 416     P Axis 09/25/2024 23     R Axis 09/25/2024 9     T [place] 09/25/2024 3     QRS Count 09/25/2024 14     Q Onset 09/25/2024 219     P Onset 09/25/2024 148     P Offset 09/25/2024 201     T Offset 09/25/2024 394     QTC Fred[place] 09/25/2024 393     HCG, Urine 09/25/2024 NEGATIVE     WBC 09/25/2024 8.6     nRBC 09/25/2024 0.0     RBC 09/25/2024 5.05     Hemoglobin 09/25/2024 13.9     Hematocrit 09/25/2024 42.2     MCV 09/25/2024 84     MCH 09/25/2024 27.5     MCHC 09/25/2024 32.9     RDW 09/25/2024 17.0 (H)     Platelets 09/25/2024 225     Neutrophils % 09/25/2024 76.3     Immature Granulocytes %,* 09/25/2024 0.1     Lymphocytes % 09/25/2024 16.8     Monocytes % 09/25/2024 6.3     Eosinophils % 09/25/2024 0.3     Basophils % 09/25/2024 0.2     Neutrophils Absolute 09/25/2024 6.59     Immature Granulocytes Ab* 09/25/2024 0.01     Lymphocytes Absolute 09/25/2024 1.45     Monocytes Absolute 09/25/2024 0.54     Eosinophils Absolute 09/25/2024 0.03     Basophils Absolute 09/25/2024 0.02     Glucose 09/25/2024 95     Sodium 09/25/2024 138     Potassium 09/25/2024 4.0     Chloride 09/25/2024 105     Bicarbonate 09/25/2024 27     Anion Gap 09/25/2024 10     Urea Nitrogen 09/25/2024 13     Creatinine  09/25/2024 0.68     eGFR 09/25/2024 >90     Calcium 09/25/2024 9.3     Albumin 09/25/2024 4.2     Alkaline Phosphatase 09/25/2024 62     Total Protein 09/25/2024 7.1     AST 09/25/2024 11     Bilirubin, Total 09/25/2024 0.4     ALT 09/25/2024 13     Lipase 09/25/2024 25     Color, Urine 09/25/2024 Straw     Appearance, Urine 09/25/2024 Clear     Specific Gravity, Urine 09/25/2024 1.015     pH, Urine 09/25/2024 8.5 (N)     Protein, Urine 09/25/2024 NEGATIVE     Glucose, Urine 09/25/2024 NEGATIVE     Blood, Urine 09/25/2024 TRACE (A)     Ketones, Urine 09/25/2024 NEGATIVE     Bilirubin, Urine 09/25/2024 NEGATIVE     Urobilinogen, Urine 09/25/2024 0.2     Nitrite, Urine 09/25/2024 NEGATIVE     Leukocyte Esterase, Urine 09/25/2024 NEGATIVE     Extra Tube 09/25/2024 Hold for add-ons.     Troponin I, High Sensiti* 09/25/2024 <3     Troponin I, High Sensiti* 09/25/2024 <3     WBC, Urine 09/25/2024 NONE     RBC, Urine 09/25/2024 1-2     Squamous Epithelial Cell* 09/25/2024 1-9 (SPARSE)     Bacteria, Urine 09/25/2024 1+ (A)    Lab on 06/27/2024   Component Date Value    Thyroid Stimulating Horm* 06/27/2024 3.95     Vitamin B12 06/27/2024 352     Iron 06/27/2024 30     UIBC 06/27/2024 379 (H)     TIBC 06/27/2024 409     % Saturation 06/27/2024 7 (L)     Folate, Serum 06/27/2024 9.1     Ferritin 06/27/2024 7 (L)    Admission on 06/26/2024, Discharged on 06/26/2024   Component Date Value    Ventricular Rate 06/26/2024 95     Atrial Rate 06/26/2024 95     [place] Interval 06/26/2024 136     QRS Duration 06/26/2024 76     QT Interval 06/26/2024 324     QTC Calculation(Bazett) 06/26/2024 407     P Axis 06/26/2024 37     R [place] 06/26/2024 22     T Axis 06/26/2024 2     QRS Count 06/26/2024 16     Q Onset 06/26/2024 222     P Onset 06/26/2024 154     P Offset 06/26/2024 204     T Offset 06/26/2024 384     QTC Fredericia 06/26/2024 377     WBC 06/26/2024 7.7     nRBC 06/26/2024 0.0     RBC 06/26/2024 4.75     Hemoglobin 06/26/2024 10.9  (L)     Hematocrit 06/26/2024 35.5 (L)     MCV 06/26/2024 75 (L)     MCH 06/26/2024 22.9 (L)     MCHC 06/26/2024 30.7 (L)     RDW 06/26/2024 17.8 (H)     Platelets 06/26/2024 290     Neutrophils % 06/26/2024 62.8     Immature Granulocytes %,* 06/26/2024 0.4     Lymphocytes % 06/26/2024 28.3     Monocytes % 06/26/2024 7.0     Eosinophils % 06/26/2024 1.2     Basophils % 06/26/2024 0.3     Neutrophils Absolute 06/26/2024 4.82     Immature Granulocytes Ab* 06/26/2024 0.03     Lymphocytes Absolute 06/26/2024 2.17     Monocytes Absolute 06/26/2024 0.54     Eosinophils Absolute 06/26/2024 0.09     Basophils Absolute 06/26/2024 0.02     Glucose 06/26/2024 99     Sodium 06/26/2024 136     Potassium 06/26/2024 3.8     Chloride 06/26/2024 101     Bicarbonate 06/26/2024 25     Urea Nitrogen 06/26/2024 12     Creatinine 06/26/2024 0.90     eGFR 06/26/2024 82     Calcium 06/26/2024 9.6     Albumin 06/26/2024 4.2     Alkaline Phosphatase 06/26/2024 81     Total Protein 06/26/2024 7.4     AST 06/26/2024 15     Bilirubin, Total 06/26/2024 0.2     ALT 06/26/2024 12     Anion Gap 06/26/2024 10     Lipase 06/26/2024 57     HCG, Urine 06/26/2024 NEGATIVE     Troponin T, High Sensiti* 06/26/2024 6     Color, Urine 06/26/2024 Colorless (N)     Appearance, Urine 06/26/2024 Clear     Specific Gravity, Urine 06/26/2024 1.011     pH, Urine 06/26/2024 5.5     Protein, Urine 06/26/2024 NEGATIVE     Glucose, Urine 06/26/2024 Normal     Blood, Urine 06/26/2024 NEGATIVE     Ketones, Urine 06/26/2024 NEGATIVE     Bilirubin, Urine 06/26/2024 NEGATIVE     Urobilinogen, Urine 06/26/2024 Normal     Nitrite, Urine 06/26/2024 NEGATIVE     Leukocyte Esterase, Urine 06/26/2024 NEGATIVE     Extra Tube 06/26/2024 Hold for add-ons.     Troponin T, High Sensiti* 06/26/2024 <6    Admission on 04/08/2024, Discharged on 04/08/2024   Component Date Value    Ventricular Rate 04/08/2024 104     Atrial Rate 04/08/2024 104     AL Interval 04/08/2024 146     QRS  Duration 04/08/2024 78     QT Interval 04/08/2024 326     QTC Calculation(Bazett) 04/08/2024 428     P Axis 04/08/2024 43     R Axis 04/08/2024 42     T [place] 04/08/2024 -21     QRS Count 04/08/2024 17     Q Onset 04/08/2024 217     P Onset 04/08/2024 144     P Offset 04/08/2024 200     T Offset 04/08/2024 380     QTC Fredericia 04/08/2024 391     WBC 04/08/2024 10.5     nRBC 04/08/2024 0.0     RBC 04/08/2024 4.75     Hemoglobin 04/08/2024 10.9 (L)     Hematocrit 04/08/2024 35.4 (L)     MCV 04/08/2024 75 (L)     MCH 04/08/2024 22.9 (L)     MCHC 04/08/2024 30.8 (L)     RDW 04/08/2024 16.7 (H)     Platelets 04/08/2024 302     Neutrophils % 04/08/2024 73.5     Immature Granulocytes %,* 04/08/2024 0.2     Lymphocytes % 04/08/2024 19.9     Monocytes % 04/08/2024 5.6     Eosinophils % 04/08/2024 0.6     Basophils % 04/08/2024 0.2     Neutrophils Absolute 04/08/2024 7.70     Immature Granulocytes Ab* 04/08/2024 0.02     Lymphocytes Absolute 04/08/2024 2.08     Monocytes Absolute 04/08/2024 0.59     Eosinophils Absolute 04/08/2024 0.06     Basophils Absolute 04/08/2024 0.02     Glucose 04/08/2024 106 (H)     Sodium 04/08/2024 135     Potassium 04/08/2024 3.6     Chloride 04/08/2024 99     Bicarbonate 04/08/2024 24     Urea Nitrogen 04/08/2024 15     Creatinine 04/08/2024 0.80     eGFR 04/08/2024 >90     Calcium 04/08/2024 9.1     Albumin 04/08/2024 4.3     Alkaline Phosphatase 04/08/2024 94     Total Protein 04/08/2024 7.8     AST 04/08/2024 13     Bilirubin, Total 04/08/2024 <0.2     ALT 04/08/2024 16     Anion Gap 04/08/2024 12     HCG, Urine 04/08/2024 NEGATIVE     Color, Urine 04/08/2024 Colorless (N)     Appearance, Urine 04/08/2024 Clear     Specific Gravity, Urine 04/08/2024 1.014     pH, Urine 04/08/2024 5.0     Protein, Urine 04/08/2024 NEGATIVE     Glucose, Urine 04/08/2024 Normal     Blood, Urine 04/08/2024 NEGATIVE     Ketones, Urine 04/08/2024 NEGATIVE     Bilirubin, Urine 04/08/2024 NEGATIVE     Urobilinogen,  Urine 2024 Normal     Nitrite, Urine 2024 NEGATIVE     Leukocyte Esterase, Urine 2024 NEGATIVE     Extra Tube 2024 Hold for add-ons.     Troponin T, High Sensiti* 2024 <6     Lipase 2024 45     Troponin T, High Sensiti* 2024 <6         Assessment/Plan   1.?  Coronary artery disease.  The patient is a middle-aged white female whose cardiac risk factors are negative for hypertension diabetes or hyperlipidemia.  Lipid panel included cholesterol 147 LDL 73 HDL 60 triglycerides 69 untreated.  She does have obesity.  The patient is self-admitted health anxiety.  The patient was seen by a outside cardiologist for borderline elevated blood pressure.  Patient has been monitoring her blood pressure at home which evidently includes readings in the range of 130//98.  Of note she has been a lifetime non-smoker and nondrinker.  She did have an EKG performed 2023 which showed sinus rhythm and was unremarkable.  Her family history is notable for father.  Hypertension CVA and CABG.  Mother had hypertension and CHF.  Patient was recently seen at [facility] emergency room for burning in pressure in the chest.  Her evaluation at that time included an EKG showing sinus rhythm normal record CBC CMP were normal and the high-sensitivity troponins were less than 3 twice.  The patient did have COVID in 2021.  Patient will have a CT coronary calcium score performed for further evaluation before deciding whether or not there is a need for stress testing.  2.  Obesity.  3.  GERD.  4.  History of health anxiety  5.  Status post varicose vein ablation  6.  Status post  section.  7.  Status post tonsillectomy.        Orders:  No orders of the defined types were placed in this encounter.     Followup Appts:  Future Appointments   Date Time Provider Department Center   10/4/2024  1:30 PM XIMENA Rapp   10/7/2024  3:15 PM Sandrine Jain RD, LD [id number] [facility]    10/21/2024  6:00 PM Crystal Vance, APRN-CNP [id number] [facility]   11/5/2024  3:30 PM MEHDI LOPEZ [facility]           ____________________________________________________________  MD [facility]  Assistant Clinical Professor, [employer]  [facility]

## 2024-09-27 NOTE — PATIENT INSTRUCTIONS
Provided excuse for visit today   Continue medications  Labs done this year   Schedule CT calcium score [phone]  Follow up in 3/2025

## 2024-10-04 ENCOUNTER — APPOINTMENT (OUTPATIENT)
Dept: OBSTETRICS AND GYNECOLOGY | Facility: CLINIC | Age: 44
End: 2024-10-04
Payer: COMMERCIAL

## 2024-10-07 ENCOUNTER — APPOINTMENT (OUTPATIENT)
Dept: ENDOCRINOLOGY | Facility: CLINIC | Age: 44
End: 2024-10-07
Payer: COMMERCIAL

## 2024-10-10 ENCOUNTER — PATIENT OUTREACH (OUTPATIENT)
Dept: CARE COORDINATION | Facility: CLINIC | Age: 44
End: 2024-10-10
Payer: COMMERCIAL

## 2024-10-10 ENCOUNTER — DOCUMENTATION (OUTPATIENT)
Dept: CARE COORDINATION | Facility: CLINIC | Age: 44
End: 2024-10-10
Payer: COMMERCIAL

## 2024-10-10 NOTE — PROGRESS NOTES
Outreach call to patient following up on appointment with primary care provider. Or primary consult .Left message for patient. Followed up with cardiology, and Dr. Wayne.

## 2024-10-11 ENCOUNTER — APPOINTMENT (OUTPATIENT)
Dept: PRIMARY CARE | Facility: CLINIC | Age: 44
End: 2024-10-11
Payer: COMMERCIAL

## 2024-10-11 ENCOUNTER — TELEMEDICINE (OUTPATIENT)
Dept: PRIMARY CARE | Facility: CLINIC | Age: 44
End: 2024-10-11
Payer: COMMERCIAL

## 2024-10-11 DIAGNOSIS — J01.00 ACUTE NON-RECURRENT MAXILLARY SINUSITIS: Primary | ICD-10-CM

## 2024-10-11 PROBLEM — B37.9 YEAST INFECTION: Status: RESOLVED | Noted: 2024-09-25 | Resolved: 2024-10-11

## 2024-10-11 PROCEDURE — 99214 OFFICE O/P EST MOD 30 MIN: CPT | Performed by: NURSE PRACTITIONER

## 2024-10-11 PROCEDURE — 1036F TOBACCO NON-USER: CPT | Performed by: NURSE PRACTITIONER

## 2024-10-11 RX ORDER — DOXYCYCLINE 100 MG/1
100 CAPSULE ORAL 2 TIMES DAILY
Qty: 14 CAPSULE | Refills: 0 | Status: SHIPPED | OUTPATIENT
Start: 2024-10-11 | End: 2024-10-18

## 2024-10-11 RX ORDER — METHYLPREDNISOLONE 4 MG/1
TABLET ORAL
Qty: 21 TABLET | Refills: 0 | Status: SHIPPED | OUTPATIENT
Start: 2024-10-11

## 2024-10-11 ASSESSMENT — LIFESTYLE VARIABLES
HISTORY_OF_SMOKING: I HAVE NEVER SMOKED
HISTORY_OF_SMOKING: I HAVE NEVER SMOKED

## 2024-10-11 NOTE — PROGRESS NOTES
Subjective   Patient ID: Lindsey Sage is a 43 y.o. female who presents for Sinus Problem.    This visit was completed via [facility] telehealth visit. All issues as below were discussed and addressed but no physical exam was performed. If it was felt that the patient should be evaluated in clinic than they were directed there. The patient verbally consented to the visit.   Patient is located in [place] and verified   Presents to discuss sinus pressure, congestion, sinus headache, and PND  Does have non productive cough   She has been taking claritan and nasocort with minimal relief  Denies fever, shortness of breath, or chest pain          Review of Systems   All other systems reviewed and are negative.      Objective   There were no vitals taken for this visit.    Physical Exam    Assessment/Plan   Problem List Items Addressed This Visit             ICD-10-CM    Acute non-recurrent maxillary sinusitis - Primary J01.00     - completed augmentin 2 months ago  - doxycycline BID x 7 days  - medrol dose pack   -  Nasal saline, increase fluids  -  hand hygiene and infection prevention discussed  -  Warm compress to sinuses  - humidification  - recommend to eat yogurt twice daily while taking antibiotic or a daily probiotic           Relevant Medications    doxycycline (Vibramycin) 100 mg capsule    methylPREDNISolone (Medrol Dospak) 4 mg tablets

## 2024-10-21 ENCOUNTER — APPOINTMENT (OUTPATIENT)
Dept: ENDOCRINOLOGY | Facility: CLINIC | Age: 44
End: 2024-10-21
Payer: COMMERCIAL

## 2024-10-22 ENCOUNTER — APPOINTMENT (OUTPATIENT)
Dept: RADIOLOGY | Facility: HOSPITAL | Age: 44
End: 2024-10-22
Payer: COMMERCIAL

## 2024-11-18 ENCOUNTER — APPOINTMENT (OUTPATIENT)
Dept: RADIOLOGY | Facility: HOSPITAL | Age: 44
End: 2024-11-18
Payer: COMMERCIAL

## 2024-12-04 ENCOUNTER — OFFICE VISIT (OUTPATIENT)
Dept: PRIMARY CARE | Facility: EXTERNAL LOCATION | Age: 44
End: 2024-12-04

## 2024-12-04 VITALS
BODY MASS INDEX: 55.27 KG/M2 | OXYGEN SATURATION: 100 % | TEMPERATURE: 97.8 F | HEART RATE: 87 BPM | DIASTOLIC BLOOD PRESSURE: 110 MMHG | WEIGHT: 293 LBS | SYSTOLIC BLOOD PRESSURE: 166 MMHG

## 2024-12-04 DIAGNOSIS — J01.00 ACUTE NON-RECURRENT MAXILLARY SINUSITIS: Primary | ICD-10-CM

## 2024-12-04 RX ORDER — AMOXICILLIN AND CLAVULANATE POTASSIUM 875; 125 MG/1; MG/1
875 TABLET, FILM COATED ORAL 2 TIMES DAILY
Qty: 14 TABLET | Refills: 0 | Status: SHIPPED | OUTPATIENT
Start: 2024-12-04 | End: 2024-12-11

## 2024-12-04 NOTE — PROGRESS NOTES
Subjective   Patient ID: Lindsey Sage is a 44 y.o. female who presents for Cough, Headache, Otitis Media (Right ear), and Nasal Congestion.    HPI:  1 week ago started with a cough, nasal congestion, sinus pressure and right sided ear pain. Mild nausea. No vomiting.   No chest pain or SOB.   No upper back pain.   No fever or chills.   Reports mostly in head not in chest.     BP elevated. Is anxious. Has been following with cardio. Has had EKGS that were normal. Ct coronary calcium score scheduled. Has annual exam with PCP dec 9th.     LMP: current    Allergies   Allergen Reactions    Erythromycin Hives and Rash       Current Outpatient Medications   Medication Sig Dispense Refill    busPIRone (Buspar) 10 mg tablet Take 1 tablet (10 mg) by mouth 2 times a day.      famotidine (Pepcid) 20 mg tablet Take 1 tablet (20 mg) by mouth 2 times a day.      ferrous sulfate 325 (65 Fe) MG EC tablet Take 65 mg by mouth once daily. Do not crush, chew, or split.      pantoprazole (ProtoNix) 40 mg EC tablet Take 1 tablet (40 mg) by mouth once daily in the morning. Take before meals. Do not crush, chew, or split.      semaglutide (Ozempic) 0.25 mg or 0.5 mg(2 mg/1.5 mL) pen injector Inject under the skin 1 (one) time per week.      triamcinolone (Nasacort) 55 mcg nasal inhaler Administer 2 sprays into each nostril once daily. 16.5 g 0    loratadine (Claritin) 10 mg tablet TAKE 1 TABLET BY MOUTH EVERY DAY (Patient not taking: Reported on 12/4/2024) 90 tablet 0    methylPREDNISolone (Medrol Dospak) 4 mg tablets Take as directed on package. (Patient not taking: Reported on 12/4/2024) 21 tablet 0     No current facility-administered medications for this visit.        Past Medical History:   Diagnosis Date    Anxiety 08/08/2024    COVID     Gastroesophageal reflux disease 08/08/2024    Morbid obesity due to excess calories (Multi) 03/01/2017    Obesity, Class III, BMI 40-49.9 (morbid obesity) (Multi) 01/22/2024       Past Surgical  History:   Procedure Laterality Date    ADENOIDECTOMY       SECTION, CLASSIC      SINUS SURGERY      TONSILLECTOMY         Review of Systems   Constitutional:  Negative for chills and fever.   HENT:  Positive for congestion, ear pain and sinus pressure.    Respiratory:  Positive for cough. Negative for chest tightness, shortness of breath and wheezing.    Cardiovascular:  Negative for chest pain and palpitations.   Gastrointestinal:  Positive for nausea. Negative for abdominal pain and vomiting.       Objective   Visit Vitals  BP (!) 166/110 Comment: Recheck manually 160/108   Pulse 87   Temp 36.6 °C (97.8 °F)   Wt 146 kg (322 lb)   LMP 2024   SpO2 100%   BMI 55.27 kg/m²   OB Status Having periods   Smoking Status Never   BSA 2.57 m²           Physical Exam  Constitutional:       General: She is not in acute distress.     Appearance: Normal appearance. She is not ill-appearing or toxic-appearing.      Comments: Sounds congested. NAD   HENT:      Right Ear: Tympanic membrane, ear canal and external ear normal.      Left Ear: Tympanic membrane, ear canal and external ear normal.      Nose: Nose normal.      Mouth/Throat:      Mouth: Mucous membranes are moist.      Pharynx: Oropharynx is clear. No oropharyngeal exudate or posterior oropharyngeal erythema.   Eyes:      General:         Right eye: No discharge.         Left eye: No discharge.      Extraocular Movements: Extraocular movements intact.      Conjunctiva/sclera: Conjunctivae normal.      Pupils: Pupils are equal, round, and reactive to light.   Cardiovascular:      Rate and Rhythm: Normal rate and regular rhythm.   Pulmonary:      Effort: Pulmonary effort is normal. No respiratory distress.      Breath sounds: Normal breath sounds. No wheezing, rhonchi or rales.   Musculoskeletal:      Cervical back: Neck supple.   Neurological:      General: No focal deficit present.      Mental Status: She is alert.   Psychiatric:         Mood and Affect:  Mood normal.         Behavior: Behavior normal.         Thought Content: Thought content normal.           Assessment/Plan   Diagnoses and all orders for this visit:  Acute non-recurrent maxillary sinusitis  -     amoxicillin-pot clavulanate (Augmentin) 875-125 mg tablet; Take 1 tablet (875 mg) by mouth 2 times a day for 7 days.    - Antibiotic sent to pharmacy. Possible side effects and interactions discussed.  - Recommend probiotic while on antibiotic.   - Recommend Flonase daily, decongestant as needed (avoid sudafed as BP elevated), increase liquid intake and use humidified air in sleeping areas.   - Follow up with PCP in 3-5 days if no improvement.  Can be seen sooner in UC with concerns or with any worsening symptoms.   - BP elevated. Recommend monitoring and recording. ED if significantly elevated or if has chest pain, SOB, etc. Follow up with PCP in a few days for recheck.   - Recommend annual exams/preventative screenings with PCP. Discussed care gaps with patient.  Has appointment scheduled for Dec 9th.

## 2024-12-06 ASSESSMENT — ENCOUNTER SYMPTOMS
FEVER: 0
SINUS PRESSURE: 1
ABDOMINAL PAIN: 0
CHILLS: 0
COUGH: 1
CHEST TIGHTNESS: 0
WHEEZING: 0
NAUSEA: 1
VOMITING: 0
SHORTNESS OF BREATH: 0
PALPITATIONS: 0

## 2024-12-09 ENCOUNTER — LAB (OUTPATIENT)
Dept: LAB | Facility: LAB | Age: 44
End: 2024-12-09
Payer: COMMERCIAL

## 2024-12-09 DIAGNOSIS — E66.01 CLASS 3 SEVERE OBESITY WITHOUT SERIOUS COMORBIDITY WITH BODY MASS INDEX (BMI) OF 50.0 TO 59.9 IN ADULT, UNSPECIFIED OBESITY TYPE: ICD-10-CM

## 2024-12-09 DIAGNOSIS — E66.813 CLASS 3 SEVERE OBESITY WITHOUT SERIOUS COMORBIDITY WITH BODY MASS INDEX (BMI) OF 50.0 TO 59.9 IN ADULT, UNSPECIFIED OBESITY TYPE: ICD-10-CM

## 2024-12-09 LAB
CHOLEST SERPL-MCNC: 150 MG/DL (ref 0–199)
CHOLEST/HDLC SERPL: 2.8 {RATIO}
EST. AVERAGE GLUCOSE BLD GHB EST-MCNC: 100 MG/DL
HBA1C MFR BLD: 5.1 %
HDLC SERPL-MCNC: 54.4 MG/DL
LDLC SERPL CALC-MCNC: 75 MG/DL
NON HDL CHOLESTEROL: 96 MG/DL (ref 0–149)
TRIGL SERPL-MCNC: 102 MG/DL (ref 0–149)
VLDL: 20 MG/DL (ref 0–40)

## 2024-12-09 PROCEDURE — 83036 HEMOGLOBIN GLYCOSYLATED A1C: CPT

## 2024-12-09 PROCEDURE — 36415 COLL VENOUS BLD VENIPUNCTURE: CPT

## 2024-12-09 PROCEDURE — 80061 LIPID PANEL: CPT

## 2024-12-18 ENCOUNTER — HOSPITAL ENCOUNTER (EMERGENCY)
Facility: HOSPITAL | Age: 44
Discharge: HOME | End: 2024-12-18
Attending: EMERGENCY MEDICINE
Payer: COMMERCIAL

## 2024-12-18 ENCOUNTER — HOSPITAL ENCOUNTER (OUTPATIENT)
Dept: RADIOLOGY | Facility: HOSPITAL | Age: 44
Discharge: HOME | End: 2024-12-18
Payer: COMMERCIAL

## 2024-12-18 ENCOUNTER — APPOINTMENT (OUTPATIENT)
Dept: CARDIOLOGY | Facility: HOSPITAL | Age: 44
End: 2024-12-18
Payer: COMMERCIAL

## 2024-12-18 ENCOUNTER — APPOINTMENT (OUTPATIENT)
Dept: RADIOLOGY | Facility: HOSPITAL | Age: 44
End: 2024-12-18
Payer: COMMERCIAL

## 2024-12-18 VITALS
TEMPERATURE: 97.5 F | SYSTOLIC BLOOD PRESSURE: 142 MMHG | BODY MASS INDEX: 50.02 KG/M2 | HEART RATE: 84 BPM | WEIGHT: 293 LBS | HEIGHT: 64 IN | DIASTOLIC BLOOD PRESSURE: 83 MMHG | RESPIRATION RATE: 18 BRPM | OXYGEN SATURATION: 98 %

## 2024-12-18 DIAGNOSIS — K21.9 GASTROESOPHAGEAL REFLUX DISEASE, UNSPECIFIED WHETHER ESOPHAGITIS PRESENT: ICD-10-CM

## 2024-12-18 DIAGNOSIS — Z82.49 FAMILY HISTORY OF MI (MYOCARDIAL INFARCTION): ICD-10-CM

## 2024-12-18 DIAGNOSIS — R10.32 LEFT LOWER QUADRANT ABDOMINAL PAIN: ICD-10-CM

## 2024-12-18 DIAGNOSIS — R10.13 EPIGASTRIC ABDOMINAL PAIN: Primary | ICD-10-CM

## 2024-12-18 LAB
ALBUMIN SERPL BCP-MCNC: 4.4 G/DL (ref 3.4–5)
ALP SERPL-CCNC: 57 U/L (ref 33–110)
ALT SERPL W P-5'-P-CCNC: 16 U/L (ref 7–45)
ANION GAP SERPL CALC-SCNC: 10 MMOL/L (ref 10–20)
APPEARANCE UR: CLEAR
AST SERPL W P-5'-P-CCNC: 15 U/L (ref 9–39)
ATRIAL RATE: 98 BPM
BASOPHILS # BLD AUTO: 0.01 X10*3/UL (ref 0–0.1)
BASOPHILS NFR BLD AUTO: 0.1 %
BILIRUB SERPL-MCNC: 0.4 MG/DL (ref 0–1.2)
BILIRUB UR STRIP.AUTO-MCNC: NEGATIVE MG/DL
BUN SERPL-MCNC: 12 MG/DL (ref 6–23)
CALCIUM SERPL-MCNC: 9.8 MG/DL (ref 8.6–10.3)
CARDIAC TROPONIN I PNL SERPL HS: <3 NG/L (ref 0–13)
CHLORIDE SERPL-SCNC: 102 MMOL/L (ref 98–107)
CO2 SERPL-SCNC: 28 MMOL/L (ref 21–32)
COLOR UR: COLORLESS
CREAT SERPL-MCNC: 0.86 MG/DL (ref 0.5–1.05)
EGFRCR SERPLBLD CKD-EPI 2021: 86 ML/MIN/1.73M*2
EOSINOPHIL # BLD AUTO: 0.07 X10*3/UL (ref 0–0.7)
EOSINOPHIL NFR BLD AUTO: 1 %
ERYTHROCYTE [DISTWIDTH] IN BLOOD BY AUTOMATED COUNT: 13 % (ref 11.5–14.5)
GLUCOSE SERPL-MCNC: 95 MG/DL (ref 74–99)
GLUCOSE UR STRIP.AUTO-MCNC: NEGATIVE MG/DL
HCT VFR BLD AUTO: 40.5 % (ref 36–46)
HGB BLD-MCNC: 13.4 G/DL (ref 12–16)
HOLD SPECIMEN: NORMAL
IMM GRANULOCYTES # BLD AUTO: 0.02 X10*3/UL (ref 0–0.7)
IMM GRANULOCYTES NFR BLD AUTO: 0.3 % (ref 0–0.9)
KETONES UR STRIP.AUTO-MCNC: NEGATIVE MG/DL
LEUKOCYTE ESTERASE UR QL STRIP.AUTO: NEGATIVE
LIPASE SERPL-CCNC: 37 U/L (ref 9–82)
LYMPHOCYTES # BLD AUTO: 1.64 X10*3/UL (ref 1.2–4.8)
LYMPHOCYTES NFR BLD AUTO: 22.5 %
MCH RBC QN AUTO: 28.4 PG (ref 26–34)
MCHC RBC AUTO-ENTMCNC: 33.1 G/DL (ref 32–36)
MCV RBC AUTO: 86 FL (ref 80–100)
MONOCYTES # BLD AUTO: 0.55 X10*3/UL (ref 0.1–1)
MONOCYTES NFR BLD AUTO: 7.5 %
NEUTROPHILS # BLD AUTO: 5 X10*3/UL (ref 1.2–7.7)
NEUTROPHILS NFR BLD AUTO: 68.6 %
NITRITE UR QL STRIP.AUTO: NEGATIVE
NRBC BLD-RTO: 0 /100 WBCS (ref 0–0)
P AXIS: 36 DEGREES
P OFFSET: 205 MS
P ONSET: 151 MS
PH UR STRIP.AUTO: 8 [PH]
PLATELET # BLD AUTO: 245 X10*3/UL (ref 150–450)
POTASSIUM SERPL-SCNC: 3.8 MMOL/L (ref 3.5–5.3)
PR INTERVAL: 140 MS
PROT SERPL-MCNC: 7.6 G/DL (ref 6.4–8.2)
PROT UR STRIP.AUTO-MCNC: NEGATIVE MG/DL
Q ONSET: 221 MS
QRS COUNT: 16 BEATS
QRS DURATION: 76 MS
QT INTERVAL: 332 MS
QTC CALCULATION(BAZETT): 423 MS
QTC FREDERICIA: 391 MS
R AXIS: 31 DEGREES
RBC # BLD AUTO: 4.72 X10*6/UL (ref 4–5.2)
RBC # UR STRIP.AUTO: NEGATIVE /UL
SODIUM SERPL-SCNC: 136 MMOL/L (ref 136–145)
SP GR UR STRIP.AUTO: 1
T AXIS: -39 DEGREES
T OFFSET: 387 MS
UROBILINOGEN UR STRIP.AUTO-MCNC: <2 MG/DL
VENTRICULAR RATE: 98 BPM
WBC # BLD AUTO: 7.3 X10*3/UL (ref 4.4–11.3)

## 2024-12-18 PROCEDURE — 75571 CT HRT W/O DYE W/CA TEST: CPT

## 2024-12-18 PROCEDURE — 85025 COMPLETE CBC W/AUTO DIFF WBC: CPT | Performed by: EMERGENCY MEDICINE

## 2024-12-18 PROCEDURE — 2500000001 HC RX 250 WO HCPCS SELF ADMINISTERED DRUGS (ALT 637 FOR MEDICARE OP)

## 2024-12-18 PROCEDURE — 81003 URINALYSIS AUTO W/O SCOPE: CPT | Performed by: EMERGENCY MEDICINE

## 2024-12-18 PROCEDURE — 99285 EMERGENCY DEPT VISIT HI MDM: CPT | Mod: 25 | Performed by: EMERGENCY MEDICINE

## 2024-12-18 PROCEDURE — 2500000005 HC RX 250 GENERAL PHARMACY W/O HCPCS

## 2024-12-18 PROCEDURE — 2550000001 HC RX 255 CONTRASTS: Performed by: EMERGENCY MEDICINE

## 2024-12-18 PROCEDURE — 84484 ASSAY OF TROPONIN QUANT: CPT | Performed by: EMERGENCY MEDICINE

## 2024-12-18 PROCEDURE — 36415 COLL VENOUS BLD VENIPUNCTURE: CPT | Performed by: EMERGENCY MEDICINE

## 2024-12-18 PROCEDURE — 74177 CT ABD & PELVIS W/CONTRAST: CPT | Performed by: RADIOLOGY

## 2024-12-18 PROCEDURE — 74177 CT ABD & PELVIS W/CONTRAST: CPT

## 2024-12-18 PROCEDURE — 83690 ASSAY OF LIPASE: CPT | Performed by: EMERGENCY MEDICINE

## 2024-12-18 PROCEDURE — 80053 COMPREHEN METABOLIC PANEL: CPT | Performed by: EMERGENCY MEDICINE

## 2024-12-18 PROCEDURE — 93005 ELECTROCARDIOGRAM TRACING: CPT

## 2024-12-18 RX ORDER — LIDOCAINE HYDROCHLORIDE 20 MG/ML
SOLUTION OROPHARYNGEAL
Status: COMPLETED
Start: 2024-12-18 | End: 2024-12-18

## 2024-12-18 RX ORDER — LIDOCAINE HYDROCHLORIDE 20 MG/ML
15 SOLUTION OROPHARYNGEAL ONCE
Status: COMPLETED | OUTPATIENT
Start: 2024-12-18 | End: 2024-12-18

## 2024-12-18 RX ORDER — ALUMINUM HYDROXIDE, MAGNESIUM HYDROXIDE, AND SIMETHICONE 1200; 120; 1200 MG/30ML; MG/30ML; MG/30ML
30 SUSPENSION ORAL ONCE
Status: COMPLETED | OUTPATIENT
Start: 2024-12-18 | End: 2024-12-18

## 2024-12-18 RX ORDER — ALUMINUM HYDROXIDE, MAGNESIUM HYDROXIDE, AND SIMETHICONE 1200; 120; 1200 MG/30ML; MG/30ML; MG/30ML
SUSPENSION ORAL
Status: COMPLETED
Start: 2024-12-18 | End: 2024-12-18

## 2024-12-18 ASSESSMENT — PAIN DESCRIPTION - DESCRIPTORS: DESCRIPTORS: BURNING;DISCOMFORT

## 2024-12-18 ASSESSMENT — PAIN DESCRIPTION - FREQUENCY: FREQUENCY: CONSTANT/CONTINUOUS

## 2024-12-18 ASSESSMENT — PAIN DESCRIPTION - ORIENTATION: ORIENTATION: LEFT

## 2024-12-18 ASSESSMENT — PAIN SCALES - GENERAL: PAINLEVEL_OUTOF10: 3

## 2024-12-18 ASSESSMENT — PAIN DESCRIPTION - LOCATION: LOCATION: OTHER (COMMENT)

## 2024-12-18 ASSESSMENT — PAIN - FUNCTIONAL ASSESSMENT: PAIN_FUNCTIONAL_ASSESSMENT: 0-10

## 2024-12-18 NOTE — ED PROVIDER NOTES
HPI   Chief Complaint   Patient presents with    Abdominal Pain       44-year-old female with history of elevated BMI on Ozempic presents for evaluation of abdominal pain.  Patient is on an 8-week round of Ozempic.  She has lost 18 pounds.  She states that she is having issues with worsening epigastric pain and frequent belching.  She has burning pain in the epigastrium that goes up into her throat.  She takes Protonix once daily and Pepcid twice daily for GERD.  She states since starting this round of Ozempic her GERD symptoms have worsened.  Separately, she has noticed a pain in her left lower abdomen.  She is also complaining of some constipation issues.  No vomiting.  No urinary complaints.  Prior .  No other prior abdominal surgeries.      History provided by:  Patient and medical records          Patient History   Past Medical History:   Diagnosis Date    Anxiety 2024    COVID     Gastroesophageal reflux disease 2024    Morbid obesity due to excess calories (Multi) 2017    Obesity, Class III, BMI 40-49.9 (morbid obesity) (Multi) 2024     Past Surgical History:   Procedure Laterality Date    ADENOIDECTOMY       SECTION, CLASSIC      SINUS SURGERY      TONSILLECTOMY       Family History   Problem Relation Name Age of Onset    Diabetes Mother      Other (COVID.) Mother      Other (heart problem) Father       Social History     Tobacco Use    Smoking status: Never    Smokeless tobacco: Never   Vaping Use    Vaping status: Never Used   Substance Use Topics    Alcohol use: Not on file    Drug use: Never       Physical Exam   ED Triage Vitals [24 1214]   Temperature Heart Rate Respirations BP   36.4 °C (97.5 °F) 94 18 (!) 176/101      SpO2 Temp Source Heart Rate Source Patient Position   98 % Temporal Monitor --      BP Location FiO2 (%)     -- --       Physical Exam  Vitals and nursing note reviewed.   Constitutional:       General: She is not in acute distress.      Appearance: She is well-developed.   HENT:      Head: Normocephalic and atraumatic.   Eyes:      Conjunctiva/sclera: Conjunctivae normal.   Cardiovascular:      Rate and Rhythm: Normal rate and regular rhythm.      Heart sounds: No murmur heard.  Pulmonary:      Effort: Pulmonary effort is normal. No respiratory distress.      Breath sounds: Normal breath sounds.   Abdominal:      Palpations: Abdomen is soft.      Tenderness: There is no abdominal tenderness. There is no right CVA tenderness, left CVA tenderness, guarding or rebound.   Musculoskeletal:         General: No swelling.      Cervical back: Neck supple.   Skin:     General: Skin is warm and dry.      Capillary Refill: Capillary refill takes less than 2 seconds.   Neurological:      Mental Status: She is alert.   Psychiatric:         Mood and Affect: Mood normal.           ED Course & MDM   ED Course as of 12/18/24 1606   Wed Dec 18, 2024   1230 ECG 12 lead  EKG interpreted by me shows sinus rhythm with rate of 98.  Normal axis.  Normal intervals.  No acute injury pattern. [BT]   1431 44-year-old female presents with GERD, worsening since she started Ozempic 2 months ago.  Additionally, she has left lower quadrant abdominal pain.  EKG shows sinus rhythm with no acute injury pattern.  Will check troponin to screen for cardiac cause of symptoms.  Laboratory studies and urinalysis.  CT abdomen pelvis with IV contrast to evaluate for acute process in the abdomen or pelvis.  Will reevaluate after initial workup. [BT]   1432 CT abdomen pelvis w IV contrast  CT shows fatty liver.  No acute intra-abdominal process.  Abdomen is soft, nontender.  No peritonitis.  Exact cause of left lower quadrant pain unclear.  Could be related to constipation.  I suspect her worsening GERD with belching is related decrease GI motility due to Ozempic.    She was given a GI cocktail.    Referred to PCP for follow-up.  Advised to return with worsening GERD abdominal pain or any other  concerns.  She agrees with this plan verbalized understanding.  Discharged home. [BT]   1432 Urinalysis with Reflex Culture and Microscopic(!)  Urine negative for infection [BT]   1432 Lipase normal.  Pancreatitis excluded.  CBC and chemistry are normal. [BT]   1605 Troponin I, High Sensitivity  Troponin negative.  Doubt acute coronary syndrome. [BT]      ED Course User Index  [BT] Milind Ramirez DO         Diagnoses as of 12/18/24 1606   Epigastric abdominal pain   Gastroesophageal reflux disease, unspecified whether esophagitis present   Left lower quadrant abdominal pain     CT abdomen pelvis w IV contrast   Final Result   1.  Hepatic steatosis.   2. No acute intra-abdominal process             MACRO:   None        Signed by: Marciano Abbasi 12/18/2024 2:16 PM   Dictation workstation:   [id number]                      No data recorded     Rowan Coma Scale Score: 15 (12/18/24 1216 : Monique Mendez, ZORA)                           Medical Decision Making      Procedure  Procedures     Milind Ramirez DO  12/18/24 1606

## 2024-12-19 ENCOUNTER — TELEMEDICINE (OUTPATIENT)
Dept: PRIMARY CARE | Facility: CLINIC | Age: 44
End: 2024-12-19
Payer: COMMERCIAL

## 2024-12-19 DIAGNOSIS — K59.09 OTHER CONSTIPATION: Primary | ICD-10-CM

## 2024-12-19 LAB — HOLD SPECIMEN: NORMAL

## 2024-12-19 PROCEDURE — 99213 OFFICE O/P EST LOW 20 MIN: CPT | Performed by: NURSE PRACTITIONER

## 2024-12-19 PROCEDURE — 1036F TOBACCO NON-USER: CPT | Performed by: NURSE PRACTITIONER

## 2024-12-19 NOTE — PROGRESS NOTES
Subjective   Patient ID: Lindsey Sage is a 44 y.o. female who presents for Constipation.    Virtual or Telephone Consent    An interactive audio and video telecommunication system which permits real time communications between the patient (at the originating site) and provider (at the distant site) was utilized to provide this telehealth service.   Verbal consent was requested and obtained from Lindsey Sage on this date, 12/19/24 for a telehealth visit.   Patient is located in [place]    Was in ER yesterday was having 'side'pain and heartburn.  Work up negative for intra-abdominal process.  Last BM was this morning but was a very small amount.  Taking miralax one capful once a day   Last BM prior to today was 2 days ago  Follows GI and will be scheduling colonoscopy   She is taking ozempic - was taking 0.5mg but decreased to 0.25 mg on Saturday                Review of Systems   All other systems reviewed and are negative.      Objective   LMP 11/27/2024     Physical Exam    Assessment/Plan   Problem List Items Addressed This Visit             ICD-10-CM    Other constipation - Primary K59.09     Take another capful of miralax in 4oz liquid tonight  If no BM in 4 hours may OTC ducolax   Reviewed ER records, images, and labs  If no BM in the morning please contact your GI

## 2024-12-19 NOTE — PATIENT INSTRUCTIONS
Take another capful of miralax in 4oz liquid tonight  If no BM in 4 hours may OTC ducolax   If no BM in the morning please contact your GI

## 2024-12-19 NOTE — ASSESSMENT & PLAN NOTE
Take another capful of miralax in 4oz liquid tonight  If no BM in 4 hours may OTC ducolax   Reviewed ER records, images, and labs  If no BM in the morning please contact your GI

## 2024-12-27 ENCOUNTER — TELEPHONE (OUTPATIENT)
Dept: CARDIOLOGY | Facility: HOSPITAL | Age: 44
End: 2024-12-27
Payer: COMMERCIAL

## 2024-12-31 NOTE — TELEPHONE ENCOUNTER
I called patient and LVM regarding the message below from Md Goodman.  I called radiology and they informed me that it is taking several weeks to read the cardiac CT scoring tests.  They also took patient's name and MRN and stated they would try to push it through to be STAT read.   I told patient via message to call back if there were no results in chart within the next week.  Anastasia Valentino RN

## 2025-01-03 ENCOUNTER — APPOINTMENT (OUTPATIENT)
Dept: SLEEP MEDICINE | Facility: CLINIC | Age: 45
End: 2025-01-03
Payer: COMMERCIAL

## 2025-01-03 ENCOUNTER — TELEMEDICINE CLINICAL SUPPORT (OUTPATIENT)
Dept: NUTRITION | Facility: HOSPITAL | Age: 45
End: 2025-01-03
Payer: COMMERCIAL

## 2025-01-03 VITALS — HEIGHT: 64 IN | BODY MASS INDEX: 54.87 KG/M2

## 2025-01-15 ENCOUNTER — APPOINTMENT (OUTPATIENT)
Dept: OBSTETRICS AND GYNECOLOGY | Facility: CLINIC | Age: 45
End: 2025-01-15
Payer: COMMERCIAL

## 2025-01-21 ENCOUNTER — APPOINTMENT (OUTPATIENT)
Dept: PREADMISSION TESTING | Facility: HOSPITAL | Age: 45
End: 2025-01-21
Payer: COMMERCIAL

## 2025-01-22 ENCOUNTER — APPOINTMENT (OUTPATIENT)
Dept: PREADMISSION TESTING | Facility: HOSPITAL | Age: 45
End: 2025-01-22
Payer: COMMERCIAL

## 2025-01-28 ENCOUNTER — APPOINTMENT (OUTPATIENT)
Dept: GASTROENTEROLOGY | Facility: HOSPITAL | Age: 45
End: 2025-01-28
Payer: COMMERCIAL

## 2025-01-31 ENCOUNTER — OFFICE VISIT (OUTPATIENT)
Dept: PRIMARY CARE | Facility: EXTERNAL LOCATION | Age: 45
End: 2025-01-31

## 2025-01-31 VITALS
WEIGHT: 293 LBS | DIASTOLIC BLOOD PRESSURE: 95 MMHG | TEMPERATURE: 98 F | OXYGEN SATURATION: 98 % | BODY MASS INDEX: 53.73 KG/M2 | SYSTOLIC BLOOD PRESSURE: 145 MMHG | HEART RATE: 84 BPM

## 2025-01-31 DIAGNOSIS — J32.9 SINUSITIS, UNSPECIFIED CHRONICITY, UNSPECIFIED LOCATION: Primary | ICD-10-CM

## 2025-01-31 DIAGNOSIS — H69.91 DYSFUNCTION OF RIGHT EUSTACHIAN TUBE: ICD-10-CM

## 2025-01-31 RX ORDER — AMOXICILLIN AND CLAVULANATE POTASSIUM 875; 125 MG/1; MG/1
875 TABLET, FILM COATED ORAL 2 TIMES DAILY
Qty: 14 TABLET | Refills: 0 | Status: SHIPPED | OUTPATIENT
Start: 2025-01-31 | End: 2025-02-07

## 2025-01-31 ASSESSMENT — ENCOUNTER SYMPTOMS
WHEEZING: 0
CHILLS: 0
SHORTNESS OF BREATH: 0
FEVER: 0
COUGH: 0
SINUS PRESSURE: 1

## 2025-01-31 NOTE — LETTER
January 31, 2025     Patient: Lindsey Sage   YOB: 1980   Date of Visit: 1/31/2025       To Whom It May Concern:    Lindsey Sage was seen in my clinic on 1/31/2025 at 8:30 am. Please excuse Lindsey for her absence from work on this day to make the appointment.             Sincerely,         Danii Dickson, APRN-CNP        CC: No Recipients

## 2025-01-31 NOTE — PATIENT INSTRUCTIONS
- Discussed viral versus bacterial etiology and expected course of illness.    - Increase liquid intake.  - Can use over the counter medication as needed.   - Use humidified air in sleeping areas.   - Take antibiotic if no improvement in the next 3 days.   - Follow up with PCP in 5 days if symptoms persist.   - Return to clinic or go to urgent care sooner with concerns or worsening symptoms.     STEPS YOU CAN TAKE AT HOME  - Get lots of rest, and drink plenty of liquids.  - Drink hot tea.  - Suck on cough drops or hard candy.  - Take over-the-counter cough and cold medicines.  - Breath in warm, moist air, such as in the shower, over a kettle, or from a humidifier.  - Use humidified air in sleeping areas.   - Take a pain-relieving medicine if you have cold or flu symptoms like headache, muscle aches, or joint pain  - Avoid smoking or being around others who smoke.   - Wash hands often.   - Do not share utensils and drinking glasses. Wash these objects with hot, soapy water.  - Do not share foods or drinks with others while you or they are sick.    CALL YOUR PROVIDER/GO TO URGENT CARE OR ED IF:   - You have trouble breathing or swallowing.  - Your neck, tongue, or throat is swollen.  - You are drooling because you cannot swallow your own saliva  - You cannot keep fluids down  - You develop chest pain or shortness of breath  - Your voice sounds strange, like you are talking through your nose.  - You can’t open your mouth all the way.  - You have a stiff neck.   - Fever higher than 100.4°F (38°C)  - Chest pain when you cough, trouble breathing, or coughing up blood  - A barking cough that makes it hard to talk  - Cough and weight loss that you cannot explain  - Symptoms that are worsening

## 2025-01-31 NOTE — PROGRESS NOTES
Subjective   Patient ID: Lindsey Sage is a 44 y.o. female who presents for Sinusitis and Otitis Media (Right ear).    HPI:  Seen 24 for sinus infection. Was given antibiotic but only took 2 days worth and symptoms resolved.     4 days ago with sinus pressure and right ear pain. No cough. No chest pain or SOB. No fever or chills.     Elevated BP. Sees cardio. Reports has white coat syndrome.     Allergies   Allergen Reactions    Erythromycin Hives and Rash       Current Outpatient Medications   Medication Sig Dispense Refill    busPIRone (Buspar) 10 mg tablet Take 1 tablet (10 mg) by mouth 2 times a day.      famotidine (Pepcid) 20 mg tablet Take 1 tablet (20 mg) by mouth 2 times a day.      pantoprazole (ProtoNix) 40 mg EC tablet Take 1 tablet (40 mg) by mouth once daily in the morning. Take before meals. Do not crush, chew, or split.      semaglutide (Ozempic) 0.25 mg or 0.5 mg(2 mg/1.5 mL) pen injector Inject under the skin 1 (one) time per week. (Patient not taking: Reported on 2025)       No current facility-administered medications for this visit.        Past Medical History:   Diagnosis Date    Anxiety 2024    COVID     Gastroesophageal reflux disease 2024    Morbid obesity due to excess calories (Multi) 2017    Obesity, Class III, BMI 40-49.9 (morbid obesity) (Multi) 2024       Past Surgical History:   Procedure Laterality Date    ADENOIDECTOMY       SECTION, CLASSIC      SINUS SURGERY      TONSILLECTOMY         Review of Systems   Constitutional:  Negative for chills and fever.   HENT:  Positive for ear pain (right), postnasal drip and sinus pressure.    Respiratory:  Negative for cough, shortness of breath and wheezing.    Cardiovascular:  Negative for chest pain.       Objective   Visit Vitals  BP (!) 145/95   Pulse 84   Temp 36.7 °C (98 °F) (Temporal)   Wt 142 kg (313 lb)   LMP 2025   SpO2 98%   BMI 53.73 kg/m²   OB Status Having periods   Smoking Status  Never   BSA 2.53 m²       Physical Exam  Constitutional:       Appearance: Normal appearance.   HENT:      Right Ear: Tympanic membrane, ear canal and external ear normal.      Left Ear: Tympanic membrane, ear canal and external ear normal.      Nose: Congestion present.      Mouth/Throat:      Mouth: Mucous membranes are moist.      Pharynx: Oropharynx is clear. No oropharyngeal exudate or posterior oropharyngeal erythema.   Eyes:      General:         Right eye: No discharge.         Left eye: No discharge.      Extraocular Movements: Extraocular movements intact.      Conjunctiva/sclera: Conjunctivae normal.   Cardiovascular:      Rate and Rhythm: Normal rate and regular rhythm.   Pulmonary:      Effort: Pulmonary effort is normal. No respiratory distress.      Breath sounds: Normal breath sounds. No wheezing, rhonchi or rales.   Neurological:      General: No focal deficit present.      Mental Status: She is alert.   Psychiatric:         Mood and Affect: Mood normal.         Behavior: Behavior normal.         Thought Content: Thought content normal.         Assessment/Plan   Diagnoses and all orders for this visit:  Sinusitis, unspecified chronicity, unspecified location  -     amoxicillin-pot clavulanate (Augmentin) 875-125 mg tablet; Take 1 tablet (875 mg) by mouth 2 times a day for 7 days.  Dysfunction of right eustachian tube    - Discussed viral versus bacterial etiology and expected course of illness.   - Symptomatic relief for now. Recommend Flonase daily,OTC pain relief as needed, increase liquid intake and use humidified air in sleeping areas.   - Will give antibiotic but instructed to take only if no improvement over the next 3 days. Patient agreeable.   - Prescription sent to pharmacy and possible side effects discussed. Recommend probiotic with medication.   - Follow up with PCP in 5 days if symptoms persist.  Can be seen sooner in UC with concerns or with any worsening symptoms.   - Recommend annual  exams/preventative screenings with PCP.

## 2025-03-31 ENCOUNTER — OFFICE VISIT (OUTPATIENT)
Dept: CARDIOLOGY | Facility: CLINIC | Age: 45
End: 2025-03-31
Payer: COMMERCIAL

## 2025-03-31 VITALS
HEART RATE: 92 BPM | BODY MASS INDEX: 50.02 KG/M2 | WEIGHT: 293 LBS | OXYGEN SATURATION: 99 % | HEIGHT: 64 IN | DIASTOLIC BLOOD PRESSURE: 98 MMHG | SYSTOLIC BLOOD PRESSURE: 143 MMHG

## 2025-03-31 DIAGNOSIS — I10 HYPERTENSION, UNSPECIFIED TYPE: Primary | ICD-10-CM

## 2025-03-31 PROCEDURE — 3080F DIAST BP >= 90 MM HG: CPT | Performed by: NURSE PRACTITIONER

## 2025-03-31 PROCEDURE — 99214 OFFICE O/P EST MOD 30 MIN: CPT | Performed by: NURSE PRACTITIONER

## 2025-03-31 PROCEDURE — 1036F TOBACCO NON-USER: CPT | Performed by: NURSE PRACTITIONER

## 2025-03-31 PROCEDURE — 3008F BODY MASS INDEX DOCD: CPT | Performed by: NURSE PRACTITIONER

## 2025-03-31 PROCEDURE — 3077F SYST BP >= 140 MM HG: CPT | Performed by: NURSE PRACTITIONER

## 2025-03-31 RX ORDER — SIMETHICONE 125 MG
1 TABLET,CHEWABLE ORAL EVERY 6 HOURS PRN
COMMUNITY

## 2025-03-31 RX ORDER — POLYETHYLENE GLYCOL 3350 17 G/17G
17 POWDER, FOR SOLUTION ORAL DAILY
COMMUNITY

## 2025-03-31 ASSESSMENT — PATIENT HEALTH QUESTIONNAIRE - PHQ9: 1. LITTLE INTEREST OR PLEASURE IN DOING THINGS: NOT AT ALL

## 2025-03-31 ASSESSMENT — ENCOUNTER SYMPTOMS
RESPIRATORY NEGATIVE: 1
CARDIOVASCULAR NEGATIVE: 1
NEUROLOGICAL NEGATIVE: 1
CONSTITUTIONAL NEGATIVE: 1
MUSCULOSKELETAL NEGATIVE: 1
GASTROINTESTINAL NEGATIVE: 1

## 2025-03-31 ASSESSMENT — PAIN SCALES - GENERAL: PAINLEVEL_OUTOF10: 0-NO PAIN

## 2025-04-03 ENCOUNTER — E-VISIT (OUTPATIENT)
Dept: PRIMARY CARE | Facility: CLINIC | Age: 45
End: 2025-04-03
Payer: COMMERCIAL

## 2025-04-03 ENCOUNTER — OFFICE VISIT (OUTPATIENT)
Dept: PRIMARY CARE | Facility: EXTERNAL LOCATION | Age: 45
End: 2025-04-03
Payer: COMMERCIAL

## 2025-04-03 VITALS
TEMPERATURE: 98.2 F | SYSTOLIC BLOOD PRESSURE: 160 MMHG | HEART RATE: 86 BPM | DIASTOLIC BLOOD PRESSURE: 113 MMHG | OXYGEN SATURATION: 99 %

## 2025-04-03 DIAGNOSIS — H66.003 NON-RECURRENT ACUTE SUPPURATIVE OTITIS MEDIA OF BOTH EARS WITHOUT SPONTANEOUS RUPTURE OF TYMPANIC MEMBRANES: Primary | ICD-10-CM

## 2025-04-03 RX ORDER — AMOXICILLIN 500 MG/1
500 CAPSULE ORAL EVERY 12 HOURS SCHEDULED
Qty: 14 CAPSULE | Refills: 0 | Status: SHIPPED | OUTPATIENT
Start: 2025-04-03 | End: 2025-04-10

## 2025-04-03 ASSESSMENT — ENCOUNTER SYMPTOMS
TROUBLE SWALLOWING: 1
CHILLS: 0
VOMITING: 0
RHINORRHEA: 1
APPETITE CHANGE: 0
COUGH: 0
WHEEZING: 0
DIARRHEA: 0
FEVER: 0
SINUS PRESSURE: 0
VOICE CHANGE: 0
SWOLLEN GLANDS: 1
NECK PAIN: 1
EYE PAIN: 0
FATIGUE: 1
HEADACHES: 1
EYE REDNESS: 0
EYE ITCHING: 0
SORE THROAT: 0
NAUSEA: 0
ACTIVITY CHANGE: 0
SINUS PAIN: 0

## 2025-04-03 ASSESSMENT — LIFESTYLE VARIABLES: HISTORY_OF_SMOKING: I HAVE NEVER SMOKED

## 2025-04-03 NOTE — PROGRESS NOTES
Subjective   Patient ID: Lindsey Sage is a 44 y.o. female who presents for Ear Fullness (Left side neck tenderness/swelling. Sinus headache.  Pt denies any cough, congestion).    Pt presents with c/o left ear fullness, neck pain since yesterday. Home covid and flu negative this morning. Post nasal drainage. Hx of seasonal allergies. Increased fatigue.     URI   This is a new problem. The current episode started yesterday. There has been no fever. Associated symptoms include congestion, headaches, neck pain, rhinorrhea and swollen glands. Pertinent negatives include no coughing, diarrhea, ear pain, nausea, plugged ear sensation, sinus pain, sore throat, vomiting or wheezing. She has tried nothing for the symptoms.        Review of Systems   Constitutional:  Positive for fatigue. Negative for activity change, appetite change, chills and fever.   HENT:  Positive for congestion, postnasal drip, rhinorrhea and trouble swallowing. Negative for ear discharge, ear pain, sinus pressure, sinus pain, sore throat, tinnitus and voice change.    Eyes:  Negative for pain, redness and itching.   Respiratory:  Negative for cough and wheezing.    Gastrointestinal:  Negative for diarrhea, nausea and vomiting.   Musculoskeletal:  Positive for neck pain.   Neurological:  Positive for headaches.       Objective   BP (!) 160/113 Comment: pt used personal bp cuff 0900 130/72/pt monitors daily  Pulse 86   Temp 36.8 °C (98.2 °F)   LMP 03/26/2025 (Exact Date)   SpO2 99%     Physical Exam  Vitals and nursing note reviewed.   Constitutional:       General: She is not in acute distress.     Appearance: Normal appearance.   HENT:      Head: Normocephalic.      Right Ear: No decreased hearing noted. Tenderness present. No drainage or swelling. A middle ear effusion is present. There is no impacted cerumen. Tympanic membrane is bulging. Tympanic membrane is not injected, perforated or erythematous.      Left Ear: Ear canal and external ear  normal. No decreased hearing noted. Tenderness present. No drainage or swelling. A middle ear effusion is present. There is no impacted cerumen. Tympanic membrane is bulging. Tympanic membrane is not injected, perforated or erythematous.      Nose: No congestion or rhinorrhea.      Mouth/Throat:      Mouth: Mucous membranes are moist.      Pharynx: Oropharynx is clear. No oropharyngeal exudate or posterior oropharyngeal erythema.   Eyes:      Conjunctiva/sclera: Conjunctivae normal.   Cardiovascular:      Rate and Rhythm: Normal rate and regular rhythm.      Heart sounds: No murmur heard.  Pulmonary:      Effort: Pulmonary effort is normal. No respiratory distress.      Breath sounds: Normal breath sounds. No stridor. No wheezing, rhonchi or rales.   Lymphadenopathy:      Cervical: No cervical adenopathy.   Skin:     General: Skin is warm and dry.   Neurological:      General: No focal deficit present.      Mental Status: She is alert. Mental status is at baseline.   Psychiatric:         Mood and Affect: Mood normal.         Behavior: Behavior normal.         Thought Content: Thought content normal.         Judgment: Judgment normal.         Assessment/Plan   Diagnoses and all orders for this visit:  Non-recurrent acute suppurative otitis media of both ears without spontaneous rupture of tympanic membranes  -     amoxicillin (Amoxil) 500 mg capsule; Take 1 capsule (500 mg) by mouth every 12 hours for 7 days.  Acute.  Begin antibiotics as prescribed. Risks and benefits discussed, adverse effects reviewed. Follow-up with PCP if symptoms have not resolved or worsen over the next 3-4 days.

## 2025-04-03 NOTE — TELEPHONE ENCOUNTER
Sinus    Never smoker  Congestion  Headache  Neck pain  Facial pressure  For a few days  LN on neck hurt  No OTC meds    Augmentin 2/28/25

## 2025-04-03 NOTE — LETTER
April 3, 2025     Patient: Lindsey Sage   YOB: 1980   Date of Visit: 4/3/2025       To Whom It May Concern:    Lindsey Sage was seen in my clinic on 4/3/2025 at 9:00 am. Please excuse Lindsey for her absence from work on this day to make the appointment.    If you have any questions or concerns, please don't hesitate to call.         Sincerely,         SAIGE Sloomon-CNP        CC: No Recipients

## 2025-04-07 ENCOUNTER — OFFICE VISIT (OUTPATIENT)
Dept: PRIMARY CARE | Facility: EXTERNAL LOCATION | Age: 45
End: 2025-04-07
Payer: COMMERCIAL

## 2025-04-07 VITALS — TEMPERATURE: 98.1 F | HEART RATE: 76 BPM | OXYGEN SATURATION: 95 %

## 2025-04-07 DIAGNOSIS — H66.001 NON-RECURRENT ACUTE SUPPURATIVE OTITIS MEDIA OF RIGHT EAR WITHOUT SPONTANEOUS RUPTURE OF TYMPANIC MEMBRANE: Primary | ICD-10-CM

## 2025-04-07 RX ORDER — AMOXICILLIN AND CLAVULANATE POTASSIUM 875; 125 MG/1; MG/1
875 TABLET, FILM COATED ORAL 2 TIMES DAILY
Qty: 14 TABLET | Refills: 0 | Status: SHIPPED | OUTPATIENT
Start: 2025-04-07 | End: 2025-04-14

## 2025-04-07 ASSESSMENT — ENCOUNTER SYMPTOMS
DIARRHEA: 1
TROUBLE SWALLOWING: 0
ABDOMINAL PAIN: 0
SINUS PRESSURE: 0
HEADACHES: 0
EYE REDNESS: 0
APPETITE CHANGE: 0
SORE THROAT: 0
NECK PAIN: 0
VOICE CHANGE: 0
WHEEZING: 0
SINUS PAIN: 0
EYE PAIN: 0
CHILLS: 0
EYE ITCHING: 0
VOMITING: 0
NAUSEA: 0
FATIGUE: 0
SWOLLEN GLANDS: 1
FACIAL SWELLING: 1
ACTIVITY CHANGE: 0
FEVER: 0
COUGH: 0
RHINORRHEA: 1

## 2025-04-07 NOTE — PROGRESS NOTES
Subjective   Patient ID: Lindsey Sage is a 44 y.o. female who presents for Earache (Seen here 4/3; Rt ear feeling full/pressure).    Pt presents with c/o ear pain. Was seen here on 4/3 for right ear pain. At that time pt was dx with bilat OM and trx with amoxicillin. Pt presents today c/o worsening ear pain. Right ear pressure, mild swelling in jaw. Diarrhea a few episodes today. Pt does have diarrhea with abx use. Denies mouth or teeth pain. Left ear has improved right ear has not.     Earache   There is pain in both ears. There has been no fever. Associated symptoms include diarrhea and rhinorrhea. Pertinent negatives include no abdominal pain, coughing, ear discharge, headaches, hearing loss, neck pain, sore throat or vomiting. She has tried acetaminophen and heat packs for the symptoms. The treatment provided mild relief.   URI   This is a new problem. The current episode started yesterday. There has been no fever. Associated symptoms include diarrhea, ear pain, rhinorrhea and swollen glands. Pertinent negatives include no abdominal pain, congestion, coughing, headaches, nausea, neck pain, plugged ear sensation, sinus pain, sneezing, sore throat, vomiting or wheezing. She has tried nothing for the symptoms.        Review of Systems   Constitutional:  Negative for activity change, appetite change, chills, fatigue and fever.   HENT:  Positive for ear pain, facial swelling and rhinorrhea. Negative for congestion, dental problem, drooling, ear discharge, hearing loss, mouth sores, postnasal drip, sinus pressure, sinus pain, sneezing, sore throat, tinnitus, trouble swallowing and voice change.    Eyes:  Negative for pain, redness and itching.   Respiratory:  Negative for cough and wheezing.    Gastrointestinal:  Positive for diarrhea. Negative for abdominal pain, nausea and vomiting.   Musculoskeletal:  Negative for neck pain.   Neurological:  Negative for headaches.       Objective   Pulse 76   Temp 36.7 °C (98.1  °F)   LMP 03/26/2025 (Exact Date)   SpO2 95%     Physical Exam  Vitals and nursing note reviewed.   Constitutional:       General: She is not in acute distress.     Appearance: Normal appearance.   HENT:      Head: Normocephalic.      Right Ear: No decreased hearing noted. Tenderness present. No drainage or swelling. A middle ear effusion is present. There is no impacted cerumen. Tympanic membrane is bulging. Tympanic membrane is not injected, perforated or erythematous.      Left Ear: Ear canal and external ear normal. No decreased hearing noted. No drainage, swelling or tenderness.  No middle ear effusion. There is no impacted cerumen. Tympanic membrane is not injected, perforated, erythematous or bulging.      Nose: No congestion or rhinorrhea.      Mouth/Throat:      Mouth: Mucous membranes are moist.      Pharynx: Oropharynx is clear. No oropharyngeal exudate or posterior oropharyngeal erythema.   Eyes:      Conjunctiva/sclera: Conjunctivae normal.   Cardiovascular:      Rate and Rhythm: Normal rate and regular rhythm.      Heart sounds: No murmur heard.  Pulmonary:      Effort: Pulmonary effort is normal. No respiratory distress.      Breath sounds: Normal breath sounds. No stridor. No wheezing, rhonchi or rales.   Lymphadenopathy:      Cervical: No cervical adenopathy.   Skin:     General: Skin is warm and dry.   Neurological:      General: No focal deficit present.      Mental Status: She is alert. Mental status is at baseline.   Psychiatric:         Mood and Affect: Mood normal.         Behavior: Behavior normal.         Thought Content: Thought content normal.         Judgment: Judgment normal.         Assessment/Plan   Diagnoses and all orders for this visit:  Non-recurrent acute suppurative otitis media of right ear without spontaneous rupture of tympanic membrane  -     amoxicillin-pot clavulanate (Augmentin) 875-125 mg tablet; Take 1 tablet (875 mg) by mouth 2 times a day for 7 days.  -will switch  amoxicillin to augmentin.   -may cont with analgesics OTC for pain  -heat for pain into jaw  -follow up with PCP if no improvement in 48 hrs of new abx  -follow up sooner in new s/s develop or worsen.

## 2025-04-16 ENCOUNTER — OFFICE VISIT (OUTPATIENT)
Dept: OTOLARYNGOLOGY | Facility: CLINIC | Age: 45
End: 2025-04-16
Payer: COMMERCIAL

## 2025-04-16 VITALS — TEMPERATURE: 96.4 F | WEIGHT: 293 LBS | HEIGHT: 64 IN | BODY MASS INDEX: 50.02 KG/M2

## 2025-04-16 DIAGNOSIS — H93.8X3 PRESSURE SENSATION IN BOTH EARS: Primary | ICD-10-CM

## 2025-04-16 DIAGNOSIS — L29.9 EAR ITCH: ICD-10-CM

## 2025-04-16 DIAGNOSIS — L29.9 EAR ITCHING: ICD-10-CM

## 2025-04-16 DIAGNOSIS — L21.9 SEBORRHEIC DERMATITIS, UNSPECIFIED: ICD-10-CM

## 2025-04-16 PROCEDURE — 3008F BODY MASS INDEX DOCD: CPT | Performed by: OTOLARYNGOLOGY

## 2025-04-16 PROCEDURE — 99203 OFFICE O/P NEW LOW 30 MIN: CPT | Performed by: OTOLARYNGOLOGY

## 2025-04-16 PROCEDURE — 1036F TOBACCO NON-USER: CPT | Performed by: OTOLARYNGOLOGY

## 2025-04-16 RX ORDER — FLUOCINOLONE ACETONIDE 0.11 MG/ML
OIL AURICULAR (OTIC)
Qty: 1 ML | Refills: 11 | Status: SHIPPED | OUTPATIENT
Start: 2025-04-16

## 2025-04-16 NOTE — PROGRESS NOTES
"Chief Complaint   Patient presents with    New Patient Visit     N/P     double ear infection for 2 1/2 weeks per pt,  has been on steroids, antibiotics, and drops  getting blocked feeling still     HPI:  Lindsey Sage is a 44 y.o. female who 2 and half weeks ago had what clinically sounds like left submandibular gland infection and bilateral acute otitis media.  Treated with 2 rounds of antibiotics as well as some antibiotic eardrops.  All pain and swelling have resolved.  However still feels some pressure in both ears.  No significant hearing loss.  No vertigo.  No drainage.  Her ears do chronically itch.  Has Flonase but not using it.    PMH:  Medical History[1]  Surgical History[2]      Medications:   Current Medications[3]     Allergies:  Allergies[4]     ROS:  Review of systems normal unless stated otherwise in the HPI and/or PMH.    Physical Exam:  Temperature 35.8 °C (96.4 °F), height 1.626 m (5' 4\"), weight 142 kg (314 lb), last menstrual period 03/26/2025. Body mass index is 53.9 kg/m².     GENERAL APPEARANCE: Well developed and well nourished.  Alert and oriented in no acute distress.  Normal vocal quality.      HEAD/FACE: No erythema or edema or facial tenderness.  Normal facial nerve function bilaterally.    EAR:       EXTERNAL: Normal pinnas and external auditory canals without lesion or obstructing wax.       MIDDLE EAR: Tympanic membranes intact and mobile with normal landmarks.  Middle ear space appears well aerated.  Microscopic exam performed as well.  Good mobility on pneumatic otoscopy.  Wax was removed off of the right tympanic membrane with microscope and suction.       TUBE STATUS: N/A       MASTOID CAVITY: N/A       HEARING: Gross hearing assessment is within normal limits.      NOSE:       VISUALIZED USING: Anterior rhinoscopy with headlight and nasal speculum.       DORSUM: Midline, nontraumatic appearance.       MUCOSA: Normal-appearing.       SECRETIONS: Normal.       SEPTUM: Midline " and nonobstructing.       INFERIOR TURBINATES: Normal.       MIDDLE TURBINATES/MEATUS: N/A       BLEEDING: N/A         ORAL CAVITY/PHARYNX:       TEETH: Adequate dentition.       TONGUE: No mass or lesion.  Normal mobility.       FLOOR OF MOUTH: No mass or lesion.  Normal bimanual palpation       PALATE: Normal hard palate, soft palate, and uvula.       OROPHARYNX: Normal without mass or lesion.  Tonsils absent       BUCCAL MUCOSA/GBS: Normal without mass or lesion.       LIPS: Normal.    LARYNX/HYPOPHARYNX/NASOPHARYNX: N/A    NECK: No palpable masses or abnormal adenopathy.  Trachea is midline.    THYROID: No thyromegaly or palpable nodule.    SALIVARY GLANDS: Normal bilateral parotid and submandibular glands by inspection and palpation.    TMJ's: Normal.    NEURO: Cranial nerve exam grossly normal bilaterally.       Assessment/Plan   Lindsey was seen today for new patient visit.  Diagnoses and all orders for this visit:  Pressure sensation in both ears (Primary)  Ear itch  Seborrheic dermatitis, unspecified  Ear itching  -     fluocinolone (DermOtic) 0.01 % ear drops; 4 drops to the affected ear/s twice daily as needed for itching. 1 bottle. 11 refills.     Having some pressure after ear infection.  Recommend Flonase on a daily basis and time.  Call or follow-up in a month if not getting better.  For her itching ears we will treat her with daily dandruff shampoo washes and Derm otic drops as needed  Follow up in about 1 month (around 2025), or if symptoms worsen or fail to improve.     Warner Ramos MD         [1]   Past Medical History:  Diagnosis Date    Anxiety 2024    COVID     Gastroesophageal reflux disease 2024    Morbid obesity due to excess calories (Multi) 2017    Obesity, Class III, BMI 40-49.9 (morbid obesity) (Multi) 2024   [2]   Past Surgical History:  Procedure Laterality Date    ADENOIDECTOMY       SECTION, CLASSIC      SINUS SURGERY      TONSILLECTOMY      [3]   Current Outpatient Medications:     busPIRone (Buspar) 10 mg tablet, Take 1 tablet (10 mg) by mouth 2 times a day., Disp: , Rfl:     famotidine (Pepcid) 20 mg tablet, Take 1 tablet (20 mg) by mouth 2 times a day., Disp: , Rfl:     pantoprazole (ProtoNix) 40 mg EC tablet, Take 1 tablet (40 mg) by mouth once daily in the morning. Take before meals. Do not crush, chew, or split., Disp: , Rfl:     polyethylene glycol (Glycolax, Miralax) 17 gram packet, Take 17 g by mouth once daily. As needed., Disp: , Rfl:     simethicone (Mylicon) 125 mg chewable tablet, Chew 1 tablet (125 mg) every 6 hours if needed for flatulence., Disp: , Rfl:     fluocinolone (DermOtic) 0.01 % ear drops, 4 drops to the affected ear/s twice daily as needed for itching. 1 bottle. 11 refills., Disp: 1 mL, Rfl: 11  [4]   Allergies  Allergen Reactions    Erythromycin Hives and Rash

## 2025-04-17 ENCOUNTER — APPOINTMENT (OUTPATIENT)
Dept: OBSTETRICS AND GYNECOLOGY | Facility: CLINIC | Age: 45
End: 2025-04-17
Payer: COMMERCIAL

## 2025-05-16 ENCOUNTER — APPOINTMENT (OUTPATIENT)
Dept: OBSTETRICS AND GYNECOLOGY | Facility: CLINIC | Age: 45
End: 2025-05-16
Payer: COMMERCIAL

## 2025-06-20 ENCOUNTER — HOSPITAL ENCOUNTER (EMERGENCY)
Facility: HOSPITAL | Age: 45
Discharge: HOME | End: 2025-06-20
Payer: COMMERCIAL

## 2025-06-20 VITALS
BODY MASS INDEX: 50.02 KG/M2 | SYSTOLIC BLOOD PRESSURE: 166 MMHG | HEART RATE: 97 BPM | DIASTOLIC BLOOD PRESSURE: 111 MMHG | TEMPERATURE: 98.4 F | OXYGEN SATURATION: 100 % | RESPIRATION RATE: 18 BRPM | HEIGHT: 64 IN | WEIGHT: 293 LBS

## 2025-06-20 DIAGNOSIS — S09.90XA CLOSED HEAD INJURY, INITIAL ENCOUNTER: Primary | ICD-10-CM

## 2025-06-20 PROCEDURE — 99281 EMR DPT VST MAYX REQ PHY/QHP: CPT

## 2025-06-20 ASSESSMENT — PAIN SCALES - GENERAL: PAINLEVEL_OUTOF10: 0 - NO PAIN

## 2025-06-20 ASSESSMENT — PAIN - FUNCTIONAL ASSESSMENT: PAIN_FUNCTIONAL_ASSESSMENT: 0-10

## 2025-06-21 NOTE — ED PROVIDER NOTES
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 Pt was wearing a mask and happened at 1930. Pt finished the game. Pt denies any neck pain. Pt stated she has a little swelling of her left eyebrow with some blurred vision in her left eye. Pt had no LOC.       HPI  See my MDM      Patient History   Medical History[1]  Surgical History[2]  Family History[3]  Social History[4]    Physical Exam   ED Triage Vitals [06/20/25 2128]   Temperature Heart Rate Respirations BP   36.9 °C (98.4 °F) 97 18 (!) 166/111      Pulse Ox Temp Source Heart Rate Source Patient Position   100 % Temporal -- --      BP Location FiO2 (%)     -- --       Physical Exam  CONSTITUTIONAL: Vital signs reviewed as charted, well-developed and in no distress  Eyes: Extraocular muscles are intact. Pupils equal round and reactive to light. Conjunctiva are pink.    ENT: Mucous membranes are moist. Tongue in the midline. Pharynx was without erythema or exudates, uvula midline mild abrasion and.  Edema overlying the left maxillary area.  Full ocular range of motion without tenderness, no bony tenderness to the orbit itself.  LUNGS: Breath sounds equal and clear to auscultation. Good air exchange, no wheezes rales or retractions, pulse oximetry is charted.  HEART: Regular rate and rhythm without murmur thrill or rub, strong tones, auscultation is normal.  ABDOMEN: Soft and nontender without guarding rebound rigidity or mass. Bowel sounds are present and normal in all quadrants. There is no palpable masses or aneurysms identified. No hepatosplenomegaly, normal abdominal exam.  NEURO: Cranial nerves II through XII intact and normal, motor exam normal, sensory exam normal, reflexes 2 /4 symmetric and normal bilateral, muscle strength is 5/5 bilateral, no cerebellar dysfunction, sensation to touch and pain is normal, normal ambulation and gait  MUSCULOSKELETAL: The  calves are nontender to palpation. Full gross active range of motion.   PSYCH: Awake alert oriented, normal mood and affect.  Skin:  Dry, normal color, warm to the touch, no rash present.        ED Course & MDM   Diagnoses as of 06/20/25 2145   Closed head injury, initial encounter                 No data recorded     [place] Coma Scale Score: 15 (06/20/25 2129 : Dylan Haro, EMT)                           Medical Decision Making    History obtained from: patient    Vital signs, nursing notes, current medications, past medical history, Surgical history, allergies, social history, family History were reviewed.         HPI:  Patient is a 44-year-old female presenting emergency room today after she was unpacking her baseball game got hit in the left side of the face mask by a felt tip.  States he did not the mask off.  She does have a little bit of swelling to the left maxillary area.  States she has a very mild headache.  States she is fine to come in to get checked out.  This happened 2 hours prior to arrival.  She states symptoms have slightly improved.      10 point ROS was reviewed and negative except Noted above in HPI.  DDX: as listed above          MDM Summary/considerations:  Labs Reviewed - No data to display  No orders to display     Medications - No data to display  New Prescriptions    No medications on file     I utilized an evidence-based risk rating tool (CMT) along with my training and experience to weigh the risk of discharge against the risks of further testing, imaging, or hospitalization. At this time I estimate the risks of additional testing, imaging, or hospitalization to be equal to or greater than the risk of discharge. I discussed my risk assessment with the patient and the patient consents to the risk of discharge as well as the risk of uncertainty in estimating outcomes. At this time, the risk of intracranial findings warranting acute surgical intervention or causing rapidly  progressive decline are so remote that the risk of imaging or testing is most likely greater than the risk of managing without imaging, or further imaging.[id number]          Patient's examination consistent with mild head injury, no red flag findings.Does have mild contusion left side of the face as well without bony tenderness.  Discussed symptomatic treatment at home, discussed red flag findings.  Patient was discharged home in stable condition.    All of the patient's questions were answered to the best of my ability.  Patient states understanding that they have been screened for an emergency today and we have not found any etiology of symptoms that requires emergent treatment or admission to the hospital at this point. They understand that they have not had definitive care day and require follow-up for treatment of their condition. They also state understanding that they may have an emergent condition that may potentially have not of detected at this visit and they must return to the emergency department if they develop any worsening of symptoms or new complaints.      I have evaluated this patient, my supervising physician was available for consultation.        Critical Care: Not warranted at this time        This chart was completed using voice recognition transcription software. Please excuse any errors of transcription including grammatical, punctuation, syntax and spelling errors.  Please contact me with any questions regarding this chart.  Procedure  Procedures       [1]   Past Medical History:  Diagnosis Date    Anxiety 2024    COVID     Gastroesophageal reflux disease 2024    Morbid obesity due to excess calories (Multi) 2017    Obesity, Class III, BMI 40-49.9 (morbid obesity) 2024   [2]   Past Surgical History:  Procedure Laterality Date    ADENOIDECTOMY       SECTION, CLASSIC      SINUS SURGERY      TONSILLECTOMY     [3]   Family History  Problem Relation Name Age  of Onset    Diabetes Mother      Other (COVID.) Mother      Other (heart problem) Father     [4]   Social History  Tobacco Use    Smoking status: Never    Smokeless tobacco: Never   Vaping Use    Vaping status: Never Used   Substance Use Topics    Alcohol use: Never    Drug use: Never        SAIGE Baca-CNP  06/20/25 6270

## 2025-07-15 ENCOUNTER — APPOINTMENT (OUTPATIENT)
Dept: OBSTETRICS AND GYNECOLOGY | Facility: HOSPITAL | Age: 45
End: 2025-07-15
Payer: COMMERCIAL

## 2025-07-23 ENCOUNTER — APPOINTMENT (OUTPATIENT)
Dept: RADIOLOGY | Facility: HOSPITAL | Age: 45
End: 2025-07-23
Payer: COMMERCIAL

## 2025-08-18 ENCOUNTER — HOSPITAL ENCOUNTER (EMERGENCY)
Facility: HOSPITAL | Age: 45
Discharge: HOME | End: 2025-08-18
Payer: COMMERCIAL

## 2025-08-18 ENCOUNTER — APPOINTMENT (OUTPATIENT)
Dept: RADIOLOGY | Facility: HOSPITAL | Age: 45
End: 2025-08-18
Payer: COMMERCIAL

## 2025-08-18 VITALS
WEIGHT: 293 LBS | OXYGEN SATURATION: 100 % | HEIGHT: 64 IN | RESPIRATION RATE: 16 BRPM | TEMPERATURE: 97.5 F | HEART RATE: 71 BPM | BODY MASS INDEX: 50.02 KG/M2 | SYSTOLIC BLOOD PRESSURE: 143 MMHG | DIASTOLIC BLOOD PRESSURE: 107 MMHG

## 2025-08-18 DIAGNOSIS — R14.0 ABDOMINAL BLOATING: Primary | ICD-10-CM

## 2025-08-18 LAB
ALBUMIN SERPL BCP-MCNC: 4.3 G/DL (ref 3.4–5)
ALP SERPL-CCNC: 63 U/L (ref 33–110)
ALT SERPL W P-5'-P-CCNC: 13 U/L (ref 7–45)
ANION GAP SERPL CALCULATED.3IONS-SCNC: 11 MMOL/L (ref 10–20)
AST SERPL W P-5'-P-CCNC: 12 U/L (ref 9–39)
BASOPHILS # BLD AUTO: 0.01 X10*3/UL (ref 0–0.1)
BASOPHILS NFR BLD AUTO: 0.1 %
BILIRUB SERPL-MCNC: 0.4 MG/DL (ref 0–1.2)
BUN SERPL-MCNC: 14 MG/DL (ref 6–23)
CALCIUM SERPL-MCNC: 9.6 MG/DL (ref 8.6–10.3)
CHLORIDE SERPL-SCNC: 102 MMOL/L (ref 98–107)
CO2 SERPL-SCNC: 26 MMOL/L (ref 21–32)
CREAT SERPL-MCNC: 0.87 MG/DL (ref 0.5–1.05)
EGFRCR SERPLBLD CKD-EPI 2021: 85 ML/MIN/1.73M*2
EOSINOPHIL # BLD AUTO: 0.07 X10*3/UL (ref 0–0.7)
EOSINOPHIL NFR BLD AUTO: 0.8 %
ERYTHROCYTE [DISTWIDTH] IN BLOOD BY AUTOMATED COUNT: 13.2 % (ref 11.5–14.5)
GLUCOSE SERPL-MCNC: 89 MG/DL (ref 74–99)
HCT VFR BLD AUTO: 39.1 % (ref 36–46)
HGB BLD-MCNC: 12.8 G/DL (ref 12–16)
IMM GRANULOCYTES # BLD AUTO: 0.02 X10*3/UL (ref 0–0.7)
IMM GRANULOCYTES NFR BLD AUTO: 0.2 % (ref 0–0.9)
LIPASE SERPL-CCNC: 28 U/L (ref 9–82)
LYMPHOCYTES # BLD AUTO: 2.13 X10*3/UL (ref 1.2–4.8)
LYMPHOCYTES NFR BLD AUTO: 24.7 %
MCH RBC QN AUTO: 27.6 PG (ref 26–34)
MCHC RBC AUTO-ENTMCNC: 32.7 G/DL (ref 32–36)
MCV RBC AUTO: 84 FL (ref 80–100)
MONOCYTES # BLD AUTO: 0.64 X10*3/UL (ref 0.1–1)
MONOCYTES NFR BLD AUTO: 7.4 %
NEUTROPHILS # BLD AUTO: 5.74 X10*3/UL (ref 1.2–7.7)
NEUTROPHILS NFR BLD AUTO: 66.8 %
NRBC BLD-RTO: 0 /100 WBCS (ref 0–0)
PLATELET # BLD AUTO: 248 X10*3/UL (ref 150–450)
POTASSIUM SERPL-SCNC: 4 MMOL/L (ref 3.5–5.3)
PROT SERPL-MCNC: 7.7 G/DL (ref 6.4–8.2)
RBC # BLD AUTO: 4.63 X10*6/UL (ref 4–5.2)
SODIUM SERPL-SCNC: 135 MMOL/L (ref 136–145)
WBC # BLD AUTO: 8.6 X10*3/UL (ref 4.4–11.3)

## 2025-08-18 PROCEDURE — 74177 CT ABD & PELVIS W/CONTRAST: CPT | Mod: FOREIGN READ | Performed by: RADIOLOGY

## 2025-08-18 PROCEDURE — 99285 EMERGENCY DEPT VISIT HI MDM: CPT | Mod: 25

## 2025-08-18 PROCEDURE — 36415 COLL VENOUS BLD VENIPUNCTURE: CPT

## 2025-08-18 PROCEDURE — 2550000001 HC RX 255 CONTRASTS

## 2025-08-18 PROCEDURE — 74177 CT ABD & PELVIS W/CONTRAST: CPT

## 2025-08-18 PROCEDURE — 80053 COMPREHEN METABOLIC PANEL: CPT

## 2025-08-18 PROCEDURE — 83690 ASSAY OF LIPASE: CPT

## 2025-08-18 PROCEDURE — 85025 COMPLETE CBC W/AUTO DIFF WBC: CPT

## 2025-08-18 RX ORDER — ALUMINUM HYDROXIDE, MAGNESIUM HYDROXIDE, AND SIMETHICONE 1200; 120; 1200 MG/30ML; MG/30ML; MG/30ML
5 SUSPENSION ORAL EVERY 6 HOURS PRN
Qty: 355 ML | Refills: 0 | Status: SHIPPED | OUTPATIENT
Start: 2025-08-18 | End: 2025-08-28

## 2025-08-18 RX ADMIN — IOHEXOL 75 ML: 350 INJECTION, SOLUTION INTRAVENOUS at 21:12

## 2025-08-18 ASSESSMENT — LIFESTYLE VARIABLES
TOTAL SCORE: 0
HAVE PEOPLE ANNOYED YOU BY CRITICIZING YOUR DRINKING: NO
EVER HAD A DRINK FIRST THING IN THE MORNING TO STEADY YOUR NERVES TO GET RID OF A HANGOVER: NO
EVER FELT BAD OR GUILTY ABOUT YOUR DRINKING: NO
HAVE YOU EVER FELT YOU SHOULD CUT DOWN ON YOUR DRINKING: NO

## 2025-08-18 ASSESSMENT — PAIN - FUNCTIONAL ASSESSMENT: PAIN_FUNCTIONAL_ASSESSMENT: 0-10

## 2025-08-18 ASSESSMENT — PAIN SCALES - GENERAL: PAINLEVEL_OUTOF10: 0 - NO PAIN

## 2025-09-02 ENCOUNTER — APPOINTMENT (OUTPATIENT)
Dept: RADIOLOGY | Facility: HOSPITAL | Age: 45
End: 2025-09-02
Payer: COMMERCIAL

## 2025-09-16 ENCOUNTER — APPOINTMENT (OUTPATIENT)
Dept: OBSTETRICS AND GYNECOLOGY | Facility: HOSPITAL | Age: 45
End: 2025-09-16
Payer: COMMERCIAL

## 2025-09-25 ENCOUNTER — APPOINTMENT (OUTPATIENT)
Dept: RADIOLOGY | Facility: HOSPITAL | Age: 45
End: 2025-09-25
Payer: COMMERCIAL